# Patient Record
Sex: FEMALE | Race: ASIAN | NOT HISPANIC OR LATINO
[De-identification: names, ages, dates, MRNs, and addresses within clinical notes are randomized per-mention and may not be internally consistent; named-entity substitution may affect disease eponyms.]

---

## 2017-02-08 ENCOUNTER — APPOINTMENT (OUTPATIENT)
Dept: HEART AND VASCULAR | Facility: CLINIC | Age: 62
End: 2017-02-08

## 2017-02-08 VITALS
BODY MASS INDEX: 22.84 KG/M2 | SYSTOLIC BLOOD PRESSURE: 112 MMHG | DIASTOLIC BLOOD PRESSURE: 62 MMHG | WEIGHT: 121 LBS | HEIGHT: 61 IN | HEART RATE: 72 BPM

## 2017-02-13 ENCOUNTER — APPOINTMENT (OUTPATIENT)
Dept: SURGERY | Facility: CLINIC | Age: 62
End: 2017-02-13

## 2017-02-13 VITALS
OXYGEN SATURATION: 98 % | HEIGHT: 59 IN | WEIGHT: 121 LBS | BODY MASS INDEX: 24.39 KG/M2 | TEMPERATURE: 98.2 F | SYSTOLIC BLOOD PRESSURE: 138 MMHG | HEART RATE: 68 BPM | DIASTOLIC BLOOD PRESSURE: 78 MMHG

## 2017-02-15 LAB
ALBUMIN SERPL ELPH-MCNC: 3.9 G/DL
ALP BLD-CCNC: 74 U/L
ALT SERPL-CCNC: 22 U/L
ANION GAP SERPL CALC-SCNC: 14 MMOL/L
AST SERPL-CCNC: 17 U/L
BASOPHILS # BLD AUTO: 0.03 K/UL
BASOPHILS NFR BLD AUTO: 0.3 %
BILIRUB SERPL-MCNC: 0.8 MG/DL
BUN SERPL-MCNC: 12 MG/DL
CALCIUM SERPL-MCNC: 9.8 MG/DL
CHLORIDE SERPL-SCNC: 102 MMOL/L
CHOLEST SERPL-MCNC: 113 MG/DL
CHOLEST/HDLC SERPL: 2.8 RATIO
CO2 SERPL-SCNC: 26 MMOL/L
CREAT SERPL-MCNC: 1.01 MG/DL
EOSINOPHIL # BLD AUTO: 0.45 K/UL
EOSINOPHIL NFR BLD AUTO: 4.4 %
GLUCOSE SERPL-MCNC: 119 MG/DL
HBA1C MFR BLD HPLC: 8.2 %
HCT VFR BLD CALC: 37.1 %
HDLC SERPL-MCNC: 40 MG/DL
HGB BLD-MCNC: 11.1 G/DL
IMM GRANULOCYTES NFR BLD AUTO: 0.1 %
LDLC SERPL CALC-MCNC: 42 MG/DL
LYMPHOCYTES # BLD AUTO: 4.03 K/UL
LYMPHOCYTES NFR BLD AUTO: 39.7 %
MAN DIFF?: NORMAL
MCHC RBC-ENTMCNC: 25.7 PG
MCHC RBC-ENTMCNC: 29.9 GM/DL
MCV RBC AUTO: 85.9 FL
MONOCYTES # BLD AUTO: 0.63 K/UL
MONOCYTES NFR BLD AUTO: 6.2 %
NEUTROPHILS # BLD AUTO: 4.99 K/UL
NEUTROPHILS NFR BLD AUTO: 49.3 %
PLATELET # BLD AUTO: 302 K/UL
POTASSIUM SERPL-SCNC: 4.2 MMOL/L
PROT SERPL-MCNC: 6.7 G/DL
RBC # BLD: 4.32 M/UL
RBC # FLD: 16.2 %
SODIUM SERPL-SCNC: 142 MMOL/L
TRIGL SERPL-MCNC: 153 MG/DL
WBC # FLD AUTO: 10.14 K/UL

## 2017-03-01 NOTE — ASU PATIENT PROFILE, ADULT - PMH
Essential hypertension  HTN (hypertension)  Gastroesophageal reflux disease  GERD (gastroesophageal reflux disease)  Hyperlipidemia  HLD (hyperlipidemia)  Type 2 diabetes mellitus  DM (diabetes mellitus)

## 2017-03-02 ENCOUNTER — OUTPATIENT (OUTPATIENT)
Dept: OUTPATIENT SERVICES | Facility: HOSPITAL | Age: 62
LOS: 1 days | Discharge: HOME | End: 2017-03-02

## 2017-03-02 VITALS
HEIGHT: 59 IN | DIASTOLIC BLOOD PRESSURE: 70 MMHG | TEMPERATURE: 97 F | SYSTOLIC BLOOD PRESSURE: 156 MMHG | HEART RATE: 72 BPM | OXYGEN SATURATION: 95 % | RESPIRATION RATE: 16 BRPM | WEIGHT: 121.03 LBS

## 2017-03-02 DIAGNOSIS — Z98.890 OTHER SPECIFIED POSTPROCEDURAL STATES: Chronic | ICD-10-CM

## 2017-03-08 ENCOUNTER — APPOINTMENT (OUTPATIENT)
Dept: HEART AND VASCULAR | Facility: CLINIC | Age: 62
End: 2017-03-08

## 2017-03-08 VITALS — SYSTOLIC BLOOD PRESSURE: 114 MMHG | HEIGHT: 59 IN | HEART RATE: 72 BPM | DIASTOLIC BLOOD PRESSURE: 58 MMHG

## 2017-03-20 ENCOUNTER — APPOINTMENT (OUTPATIENT)
Dept: HEART AND VASCULAR | Facility: CLINIC | Age: 62
End: 2017-03-20

## 2017-03-20 VITALS
WEIGHT: 121 LBS | SYSTOLIC BLOOD PRESSURE: 124 MMHG | BODY MASS INDEX: 24.39 KG/M2 | HEART RATE: 66 BPM | HEIGHT: 59 IN | DIASTOLIC BLOOD PRESSURE: 62 MMHG

## 2017-03-22 ENCOUNTER — APPOINTMENT (OUTPATIENT)
Dept: HEART AND VASCULAR | Facility: CLINIC | Age: 62
End: 2017-03-22

## 2017-03-22 VITALS
SYSTOLIC BLOOD PRESSURE: 110 MMHG | DIASTOLIC BLOOD PRESSURE: 62 MMHG | BODY MASS INDEX: 25.4 KG/M2 | HEIGHT: 59 IN | WEIGHT: 126 LBS | HEART RATE: 78 BPM

## 2017-03-30 NOTE — ASU PATIENT PROFILE, ADULT - PMH
CAD (coronary artery disease)    Cholelithiasis    Diabetes mellitus    Disorder of lipid metabolism    GERD (gastroesophageal reflux disease)    Hypertension    NSTEMI (non-ST elevated myocardial infarction)    Stented coronary artery

## 2017-03-31 ENCOUNTER — OUTPATIENT (OUTPATIENT)
Dept: OUTPATIENT SERVICES | Facility: HOSPITAL | Age: 62
LOS: 1 days | Discharge: HOME | End: 2017-03-31

## 2017-03-31 VITALS
SYSTOLIC BLOOD PRESSURE: 167 MMHG | OXYGEN SATURATION: 99 % | WEIGHT: 122.8 LBS | DIASTOLIC BLOOD PRESSURE: 76 MMHG | HEART RATE: 72 BPM | TEMPERATURE: 98 F | RESPIRATION RATE: 16 BRPM | HEIGHT: 61 IN

## 2017-03-31 DIAGNOSIS — Z95.5 PRESENCE OF CORONARY ANGIOPLASTY IMPLANT AND GRAFT: Chronic | ICD-10-CM

## 2017-03-31 DIAGNOSIS — Z98.890 OTHER SPECIFIED POSTPROCEDURAL STATES: Chronic | ICD-10-CM

## 2017-04-05 ENCOUNTER — RESULT REVIEW (OUTPATIENT)
Age: 62
End: 2017-04-05

## 2017-04-05 NOTE — ASU PATIENT PROFILE, ADULT - PMH
CAD (coronary artery disease)    Essential hypertension  HTN (hypertension)  Gastroesophageal reflux disease  GERD (gastroesophageal reflux disease)  Hyperlipidemia  HLD (hyperlipidemia)  MI (myocardial infarction)  2015  Type 2 diabetes mellitus  DM (diabetes mellitus)

## 2017-04-06 ENCOUNTER — OUTPATIENT (OUTPATIENT)
Dept: OUTPATIENT SERVICES | Facility: HOSPITAL | Age: 62
LOS: 1 days | Discharge: HOME | End: 2017-04-06
Payer: COMMERCIAL

## 2017-04-06 VITALS
SYSTOLIC BLOOD PRESSURE: 144 MMHG | RESPIRATION RATE: 16 BRPM | WEIGHT: 122.14 LBS | TEMPERATURE: 97 F | OXYGEN SATURATION: 98 % | HEART RATE: 68 BPM | HEIGHT: 61 IN | DIASTOLIC BLOOD PRESSURE: 65 MMHG

## 2017-04-06 VITALS
OXYGEN SATURATION: 95 % | SYSTOLIC BLOOD PRESSURE: 146 MMHG | TEMPERATURE: 98 F | DIASTOLIC BLOOD PRESSURE: 81 MMHG | HEART RATE: 96 BPM | RESPIRATION RATE: 17 BRPM

## 2017-04-06 DIAGNOSIS — Z98.890 OTHER SPECIFIED POSTPROCEDURAL STATES: Chronic | ICD-10-CM

## 2017-04-06 DIAGNOSIS — Z95.5 PRESENCE OF CORONARY ANGIOPLASTY IMPLANT AND GRAFT: Chronic | ICD-10-CM

## 2017-04-06 PROCEDURE — 86900 BLOOD TYPING SEROLOGIC ABO: CPT

## 2017-04-06 PROCEDURE — 47562 LAPAROSCOPIC CHOLECYSTECTOMY: CPT | Mod: GC

## 2017-04-06 PROCEDURE — C1889: CPT

## 2017-04-06 PROCEDURE — 86901 BLOOD TYPING SEROLOGIC RH(D): CPT

## 2017-04-06 PROCEDURE — 88304 TISSUE EXAM BY PATHOLOGIST: CPT

## 2017-04-06 PROCEDURE — 86850 RBC ANTIBODY SCREEN: CPT

## 2017-04-06 PROCEDURE — 47562 LAPAROSCOPIC CHOLECYSTECTOMY: CPT

## 2017-04-06 RX ORDER — SODIUM CHLORIDE 9 MG/ML
500 INJECTION, SOLUTION INTRAVENOUS
Qty: 0 | Refills: 0 | Status: DISCONTINUED | OUTPATIENT
Start: 2017-04-06 | End: 2017-04-06

## 2017-04-06 NOTE — BRIEF OPERATIVE NOTE - POST-OP DX
Cholelithiasis  04/06/2017    Active  Celina Sharma Calculus of gallbladder with chronic cholecystitis without obstruction  04/06/2017    Active  Nava Prince

## 2017-04-06 NOTE — PACU DISCHARGE NOTE - COMMENTS
pt verbalized understanding all discharged instruction  pt stable ambulating to the bathroom  iv disocntinuo  pt left accompanied by her family menmendoza

## 2017-04-07 PROBLEM — I21.3 ST ELEVATION (STEMI) MYOCARDIAL INFARCTION OF UNSPECIFIED SITE: Chronic | Status: ACTIVE | Noted: 2017-04-05

## 2017-04-07 PROBLEM — I25.10 ATHEROSCLEROTIC HEART DISEASE OF NATIVE CORONARY ARTERY WITHOUT ANGINA PECTORIS: Chronic | Status: ACTIVE | Noted: 2017-04-05

## 2017-04-13 ENCOUNTER — APPOINTMENT (OUTPATIENT)
Dept: SURGERY | Facility: CLINIC | Age: 62
End: 2017-04-13

## 2017-04-13 ENCOUNTER — EMERGENCY (EMERGENCY)
Facility: HOSPITAL | Age: 62
LOS: 1 days | Discharge: HOME | End: 2017-04-13
Attending: EMERGENCY MEDICINE | Admitting: EMERGENCY MEDICINE
Payer: COMMERCIAL

## 2017-04-13 VITALS
SYSTOLIC BLOOD PRESSURE: 188 MMHG | HEIGHT: 59 IN | DIASTOLIC BLOOD PRESSURE: 83 MMHG | WEIGHT: 126 LBS | HEART RATE: 76 BPM | BODY MASS INDEX: 25.4 KG/M2 | TEMPERATURE: 97.3 F

## 2017-04-13 VITALS
SYSTOLIC BLOOD PRESSURE: 166 MMHG | HEART RATE: 62 BPM | DIASTOLIC BLOOD PRESSURE: 90 MMHG | TEMPERATURE: 98 F | OXYGEN SATURATION: 98 % | RESPIRATION RATE: 18 BRPM

## 2017-04-13 VITALS
RESPIRATION RATE: 18 BRPM | OXYGEN SATURATION: 98 % | DIASTOLIC BLOOD PRESSURE: 75 MMHG | HEART RATE: 58 BPM | SYSTOLIC BLOOD PRESSURE: 149 MMHG | TEMPERATURE: 97 F

## 2017-04-13 DIAGNOSIS — Z79.82 LONG TERM (CURRENT) USE OF ASPIRIN: ICD-10-CM

## 2017-04-13 DIAGNOSIS — Z79.899 OTHER LONG TERM (CURRENT) DRUG THERAPY: ICD-10-CM

## 2017-04-13 DIAGNOSIS — Z98.890 OTHER SPECIFIED POSTPROCEDURAL STATES: Chronic | ICD-10-CM

## 2017-04-13 DIAGNOSIS — R53.1 WEAKNESS: ICD-10-CM

## 2017-04-13 DIAGNOSIS — E78.5 HYPERLIPIDEMIA, UNSPECIFIED: ICD-10-CM

## 2017-04-13 DIAGNOSIS — I25.10 ATHEROSCLEROTIC HEART DISEASE OF NATIVE CORONARY ARTERY WITHOUT ANGINA PECTORIS: ICD-10-CM

## 2017-04-13 DIAGNOSIS — E11.9 TYPE 2 DIABETES MELLITUS WITHOUT COMPLICATIONS: ICD-10-CM

## 2017-04-13 DIAGNOSIS — R11.2 NAUSEA WITH VOMITING, UNSPECIFIED: ICD-10-CM

## 2017-04-13 DIAGNOSIS — Z95.5 PRESENCE OF CORONARY ANGIOPLASTY IMPLANT AND GRAFT: Chronic | ICD-10-CM

## 2017-04-13 DIAGNOSIS — I10 ESSENTIAL (PRIMARY) HYPERTENSION: ICD-10-CM

## 2017-04-13 LAB
ALBUMIN SERPL ELPH-MCNC: 3.6 G/DL — SIGNIFICANT CHANGE UP (ref 3.4–5)
ALP SERPL-CCNC: 91 U/L — SIGNIFICANT CHANGE UP (ref 40–120)
ALT FLD-CCNC: 21 U/L — SIGNIFICANT CHANGE UP (ref 12–42)
ANION GAP SERPL CALC-SCNC: 8 MMOL/L — LOW (ref 9–16)
APPEARANCE UR: CLEAR — SIGNIFICANT CHANGE UP
AST SERPL-CCNC: 9 U/L — LOW (ref 15–37)
BACTERIA # UR AUTO: PRESENT /HPF
BILIRUB SERPL-MCNC: 0.7 MG/DL — SIGNIFICANT CHANGE UP (ref 0.2–1.2)
BILIRUB UR-MCNC: NEGATIVE — SIGNIFICANT CHANGE UP
BUN SERPL-MCNC: 14 MG/DL — SIGNIFICANT CHANGE UP (ref 7–23)
CALCIUM SERPL-MCNC: 9.8 MG/DL — SIGNIFICANT CHANGE UP (ref 8.5–10.5)
CHLORIDE SERPL-SCNC: 101 MMOL/L — SIGNIFICANT CHANGE UP (ref 96–108)
CO2 SERPL-SCNC: 30 MMOL/L — SIGNIFICANT CHANGE UP (ref 22–31)
COLOR SPEC: YELLOW — SIGNIFICANT CHANGE UP
CREAT SERPL-MCNC: 0.76 MG/DL — SIGNIFICANT CHANGE UP (ref 0.5–1.3)
DIFF PNL FLD: (no result)
EPI CELLS # UR: SIGNIFICANT CHANGE UP /HPF
EXTRA BLUE TOP TUBE: SIGNIFICANT CHANGE UP
EXTRA SST TUBE: SIGNIFICANT CHANGE UP
GLUCOSE SERPL-MCNC: 66 MG/DL — LOW (ref 70–99)
GLUCOSE UR QL: 100
HCT VFR BLD CALC: 35.2 % — SIGNIFICANT CHANGE UP (ref 34.5–45)
HGB BLD-MCNC: 11.6 G/DL — SIGNIFICANT CHANGE UP (ref 11.5–15.5)
KETONES UR-MCNC: NEGATIVE — SIGNIFICANT CHANGE UP
LEUKOCYTE ESTERASE UR-ACNC: NEGATIVE — SIGNIFICANT CHANGE UP
MCHC RBC-ENTMCNC: 26.3 PG — LOW (ref 27–34)
MCHC RBC-ENTMCNC: 33 G/DL — SIGNIFICANT CHANGE UP (ref 32–36)
MCV RBC AUTO: 79.8 FL — LOW (ref 80–100)
NITRITE UR-MCNC: NEGATIVE — SIGNIFICANT CHANGE UP
PH UR: 6.5 — SIGNIFICANT CHANGE UP (ref 4–8)
PLATELET # BLD AUTO: 276 K/UL — SIGNIFICANT CHANGE UP (ref 150–400)
POTASSIUM SERPL-MCNC: 3.2 MMOL/L — LOW (ref 3.5–5.3)
POTASSIUM SERPL-SCNC: 3.2 MMOL/L — LOW (ref 3.5–5.3)
PROT SERPL-MCNC: 8.1 G/DL — SIGNIFICANT CHANGE UP (ref 6.4–8.2)
PROT UR-MCNC: NEGATIVE MG/DL — SIGNIFICANT CHANGE UP
RBC # BLD: 4.41 M/UL — SIGNIFICANT CHANGE UP (ref 3.8–5.2)
RBC # FLD: 15 % — SIGNIFICANT CHANGE UP (ref 10.3–16.9)
RBC CASTS # UR COMP ASSIST: < 5 /HPF — SIGNIFICANT CHANGE UP
SODIUM SERPL-SCNC: 139 MMOL/L — SIGNIFICANT CHANGE UP (ref 135–145)
SP GR SPEC: 1.01 — SIGNIFICANT CHANGE UP (ref 1–1.03)
SURGICAL PATHOLOGY STUDY: SIGNIFICANT CHANGE UP
UROBILINOGEN FLD QL: 0.2 E.U./DL — SIGNIFICANT CHANGE UP
WBC # BLD: 12.9 K/UL — HIGH (ref 3.8–10.5)
WBC # FLD AUTO: 12.9 K/UL — HIGH (ref 3.8–10.5)
WBC UR QL: < 5 /HPF — SIGNIFICANT CHANGE UP

## 2017-04-13 PROCEDURE — 93010 ELECTROCARDIOGRAM REPORT: CPT

## 2017-04-13 PROCEDURE — 82553 CREATINE MB FRACTION: CPT

## 2017-04-13 PROCEDURE — 81001 URINALYSIS AUTO W/SCOPE: CPT

## 2017-04-13 PROCEDURE — 93005 ELECTROCARDIOGRAM TRACING: CPT

## 2017-04-13 PROCEDURE — 84484 ASSAY OF TROPONIN QUANT: CPT

## 2017-04-13 PROCEDURE — 76705 ECHO EXAM OF ABDOMEN: CPT | Mod: 26

## 2017-04-13 PROCEDURE — 36415 COLL VENOUS BLD VENIPUNCTURE: CPT

## 2017-04-13 PROCEDURE — 85027 COMPLETE CBC AUTOMATED: CPT

## 2017-04-13 PROCEDURE — 99284 EMERGENCY DEPT VISIT MOD MDM: CPT | Mod: 25

## 2017-04-13 PROCEDURE — 99285 EMERGENCY DEPT VISIT HI MDM: CPT | Mod: 25

## 2017-04-13 PROCEDURE — 82550 ASSAY OF CK (CPK): CPT

## 2017-04-13 PROCEDURE — 83690 ASSAY OF LIPASE: CPT

## 2017-04-13 PROCEDURE — 80053 COMPREHEN METABOLIC PANEL: CPT

## 2017-04-13 PROCEDURE — 76705 ECHO EXAM OF ABDOMEN: CPT

## 2017-04-13 RX ORDER — SIMVASTATIN 20 MG/1
0 TABLET, FILM COATED ORAL
Qty: 0 | Refills: 0 | COMMUNITY

## 2017-04-13 RX ORDER — HUMAN INSULIN 100 [IU]/ML
0 INJECTION, SUSPENSION SUBCUTANEOUS
Qty: 0 | Refills: 0 | COMMUNITY

## 2017-04-13 RX ORDER — ASPIRIN/CALCIUM CARB/MAGNESIUM 324 MG
1 TABLET ORAL
Qty: 0 | Refills: 0 | COMMUNITY

## 2017-04-13 RX ORDER — INSULIN LISPRO 100/ML
0 VIAL (ML) SUBCUTANEOUS
Qty: 0 | Refills: 0 | COMMUNITY

## 2017-04-13 RX ORDER — METFORMIN HYDROCHLORIDE 850 MG/1
0 TABLET ORAL
Qty: 0 | Refills: 0 | COMMUNITY

## 2017-04-13 RX ORDER — SODIUM CHLORIDE 9 MG/ML
1000 INJECTION INTRAMUSCULAR; INTRAVENOUS; SUBCUTANEOUS ONCE
Qty: 0 | Refills: 0 | Status: COMPLETED | OUTPATIENT
Start: 2017-04-13 | End: 2017-04-13

## 2017-04-13 RX ORDER — SODIUM CHLORIDE 9 MG/ML
1000 INJECTION INTRAMUSCULAR; INTRAVENOUS; SUBCUTANEOUS
Qty: 0 | Refills: 0 | Status: DISCONTINUED | OUTPATIENT
Start: 2017-04-13 | End: 2017-04-17

## 2017-04-13 RX ORDER — POTASSIUM CHLORIDE 20 MEQ
40 PACKET (EA) ORAL ONCE
Qty: 0 | Refills: 0 | Status: COMPLETED | OUTPATIENT
Start: 2017-04-13 | End: 2017-04-13

## 2017-04-13 RX ADMIN — Medication 40 MILLIEQUIVALENT(S): at 19:13

## 2017-04-13 RX ADMIN — SODIUM CHLORIDE 2000 MILLILITER(S): 9 INJECTION INTRAMUSCULAR; INTRAVENOUS; SUBCUTANEOUS at 18:17

## 2017-04-13 RX ADMIN — SODIUM CHLORIDE 125 MILLILITER(S): 9 INJECTION INTRAMUSCULAR; INTRAVENOUS; SUBCUTANEOUS at 18:08

## 2017-04-13 NOTE — CONSULT NOTE ADULT - ASSESSMENT
60 yo female 1 week s/p cholecystectomy with vomiting and hypoglycemia and elevated WBC    Plan:  Obtain RUQ US looking for biloma  If negative for biloma and feeling better after sugar controlled can be DC and follow up with Dr. Prince in 2 weeks  Will follow up results of US  Discussed with Dr. Prince

## 2017-04-13 NOTE — ED ADULT NURSE REASSESSMENT NOTE - NS ED NURSE REASSESS COMMENT FT1
MD Director notified of FS, juice and abdirahman crackers given to pt. Will continue to assess. MD at bedside.

## 2017-04-13 NOTE — ED ADULT NURSE NOTE - CHPI ED SYMPTOMS NEG
no dysuria/no diarrhea/no chills/no fever/no burning urination/no blood in stool/no hematuria/no abdominal distension

## 2017-04-13 NOTE — ED PROVIDER NOTE - PROGRESS NOTE DETAILS
Pt seen briefly by surgery team - no surg issue.  Pt feeling well, asking for food and tolerating po's in ed.  Abd remains nontender.  WBC elevated w nl chem/lft's, abd exam.  Ua ordered.  FS wnl in ed, given food. Surg now requesting ruq u/s to eval for biloma.  US ordered. Pt w/o bileoma on u/s.  Cleared for dc by surgery

## 2017-04-13 NOTE — ED ADULT TRIAGE NOTE - CHIEF COMPLAINT QUOTE
BIBA c/o n/v x 2 episodes while in the car. EMS states pt reports of "gets car sickness." post gallbladder surgery ~ 1 week ago. Denies fever.

## 2017-04-13 NOTE — CONSULT NOTE ADULT - SUBJECTIVE AND OBJECTIVE BOX
HPI: 62 yo female with PMHx of CAD with stents, DM2, GERD, HLD, who recently had cholecystectomy by Dr. Prince 1 week from today presents from Dr. rPince's office for nausea, vomiting and abdominal pain. The patient reports that she often has these symptoms and that they seem related to her blood sugar. She had a low blood sugar at Dr. Prince's office. In the ED she received glucose and feels better, no abdominal pain. WBC mildly elevated to 12.8. Incisions CDI      PAST MEDICAL & SURGICAL HISTORY:  CAD (coronary artery disease)  MI (myocardial infarction): 2015  Essential hypertension: HTN (hypertension)  Type 2 diabetes mellitus: DM (diabetes mellitus)  Gastroesophageal reflux disease: GERD (gastroesophageal reflux disease)  Hyperlipidemia: HLD (hyperlipidemia)  History of surgery: galbladder  History of surgery: cardiac stent x3          MEDICATIONS  (STANDING):  sodium chloride 0.9%. 1000milliLiter(s) IV Continuous <Continuous>    MEDICATIONS  (PRN):      Allergies    No Known Drug Allergies  Originally Entered as [Rash-General] reaction to [Other][eggplant] (Rash)    Intolerances        SOCIAL HISTORY:        Vital Signs Last 24 Hrs  T(C): 36.3, Max: 36.3 (04-13 @ 17:19)  T(F): 97.3, Max: 97.3 (04-13 @ 17:19)  HR: 68 (58 - 68)  BP: 170/81 (149/75 - 170/81)  BP(mean): --  RR: 18 (18 - 18)  SpO2: 98% (98% - 98%)    Physical Exam:  Gen: In bed, A/Ox3, NAD  Card: RRR, No m/r/g  Lungs: CTAB/L, no wheezes or crackles  Abdomen: Soft, nontender, nondistended, incisions CDI  Extremities: No edema    LABS:                        11.6   12.9  )-----------( 276      ( 13 Apr 2017 18:12 )             35.2     04-13    139  |  101  |  14  ----------------------------<  66<L>  3.2<L>   |  30  |  0.76    Ca    9.8      13 Apr 2017 18:12    TPro  8.1  /  Alb  3.6  /  TBili  0.7  /  DBili  x   /  AST  9<L>  /  ALT  21  /  AlkPhos  91  04-13          RADIOLOGY & ADDITIONAL STUDIES:

## 2017-04-13 NOTE — ED PROVIDER NOTE - OBJECTIVE STATEMENT
62 yo female h/o cad s/p stent x 2, htn, dm, hld, s/p cholecystectomy 2 wk ago sent by Dr Olvera for c/o n/v x 2 while traveling to post op check appt.  Pt reports h/o motion sickness in the car w n/v in the past.  No n/v now, no abd pain, no fever, no cp/palpitations/sob.  Pt c/o gen weakness.  Pt took dm meds today, ate less than nl.  FS 50's at md's office, 79 here. Pt c/o being hungry.  No other complaint.  History via  interpreting per pt's preference.

## 2017-04-13 NOTE — ED PROVIDER NOTE - MEDICAL DECISION MAKING DETAILS
Pt c/o resolved n/v while traveling in car w h/o motion sickness.  Pt also c/o weakness, fs low at md's office, wnl here.  No abd pain and no ttp.  Afebrile.  Suspect motion sickness.  H/o cad but denies cp/cardiac sx, ekg w/o stemi.  No sig concern for acs.  Will check labs, give ivf, reassess.

## 2017-04-13 NOTE — ED ADULT NURSE NOTE - OBJECTIVE STATEMENT
60 yo F, c/o vomiting.  Pt sleepy, bangledesh speaking looking to  at bedside to translate.  Pt  states "she had gallbladder surgery in this hospital a week ago, we went to the follow up appt and she began vomiting. We checked her blood sugar and it was 69, I gave her chocolate".  Pt denies fever, cough or diarrhea, CP or SOB.

## 2017-04-14 DIAGNOSIS — K21.9 GASTRO-ESOPHAGEAL REFLUX DISEASE WITHOUT ESOPHAGITIS: ICD-10-CM

## 2017-04-14 DIAGNOSIS — Z79.02 LONG TERM (CURRENT) USE OF ANTITHROMBOTICS/ANTIPLATELETS: ICD-10-CM

## 2017-04-14 DIAGNOSIS — I25.10 ATHEROSCLEROTIC HEART DISEASE OF NATIVE CORONARY ARTERY WITHOUT ANGINA PECTORIS: ICD-10-CM

## 2017-04-14 DIAGNOSIS — Z79.82 LONG TERM (CURRENT) USE OF ASPIRIN: ICD-10-CM

## 2017-04-14 DIAGNOSIS — K80.12 CALCULUS OF GALLBLADDER WITH ACUTE AND CHRONIC CHOLECYSTITIS WITHOUT OBSTRUCTION: ICD-10-CM

## 2017-04-14 DIAGNOSIS — I25.2 OLD MYOCARDIAL INFARCTION: ICD-10-CM

## 2017-04-14 DIAGNOSIS — Z95.5 PRESENCE OF CORONARY ANGIOPLASTY IMPLANT AND GRAFT: ICD-10-CM

## 2017-04-14 DIAGNOSIS — I10 ESSENTIAL (PRIMARY) HYPERTENSION: ICD-10-CM

## 2017-04-14 DIAGNOSIS — Z79.4 LONG TERM (CURRENT) USE OF INSULIN: ICD-10-CM

## 2017-04-14 DIAGNOSIS — E11.9 TYPE 2 DIABETES MELLITUS WITHOUT COMPLICATIONS: ICD-10-CM

## 2017-04-24 ENCOUNTER — APPOINTMENT (OUTPATIENT)
Dept: SURGERY | Facility: CLINIC | Age: 62
End: 2017-04-24

## 2017-04-24 VITALS
OXYGEN SATURATION: 98 % | TEMPERATURE: 97.6 F | BODY MASS INDEX: 24.6 KG/M2 | HEART RATE: 71 BPM | DIASTOLIC BLOOD PRESSURE: 48 MMHG | WEIGHT: 122 LBS | HEIGHT: 59 IN | SYSTOLIC BLOOD PRESSURE: 104 MMHG

## 2017-04-24 DIAGNOSIS — Z86.19 PERSONAL HISTORY OF OTHER INFECTIOUS AND PARASITIC DISEASES: ICD-10-CM

## 2017-04-24 DIAGNOSIS — Z83.79 FAMILY HISTORY OF OTHER DISEASES OF THE DIGESTIVE SYSTEM: ICD-10-CM

## 2017-04-24 DIAGNOSIS — I25.2 OLD MYOCARDIAL INFARCTION: ICD-10-CM

## 2017-05-10 ENCOUNTER — APPOINTMENT (OUTPATIENT)
Dept: HEART AND VASCULAR | Facility: CLINIC | Age: 62
End: 2017-05-10

## 2017-05-10 VITALS
HEIGHT: 59 IN | SYSTOLIC BLOOD PRESSURE: 128 MMHG | WEIGHT: 124 LBS | BODY MASS INDEX: 25 KG/M2 | DIASTOLIC BLOOD PRESSURE: 70 MMHG | HEART RATE: 66 BPM

## 2017-07-12 ENCOUNTER — APPOINTMENT (OUTPATIENT)
Dept: HEART AND VASCULAR | Facility: CLINIC | Age: 62
End: 2017-07-12

## 2017-07-12 VITALS
BODY MASS INDEX: 25.2 KG/M2 | HEART RATE: 73 BPM | DIASTOLIC BLOOD PRESSURE: 60 MMHG | HEIGHT: 59 IN | WEIGHT: 125 LBS | SYSTOLIC BLOOD PRESSURE: 110 MMHG

## 2017-09-13 ENCOUNTER — APPOINTMENT (OUTPATIENT)
Dept: HEART AND VASCULAR | Facility: CLINIC | Age: 62
End: 2017-09-13
Payer: MEDICAID

## 2017-09-13 VITALS
HEART RATE: 71 BPM | BODY MASS INDEX: 25.4 KG/M2 | HEIGHT: 59 IN | SYSTOLIC BLOOD PRESSURE: 104 MMHG | WEIGHT: 126 LBS | DIASTOLIC BLOOD PRESSURE: 58 MMHG

## 2017-09-13 DIAGNOSIS — K80.20 CALCULUS OF GALLBLADDER W/OUT CHOLECYSTITIS W/OUT OBSTRUCTION: ICD-10-CM

## 2017-09-13 PROCEDURE — 99214 OFFICE O/P EST MOD 30 MIN: CPT

## 2018-03-20 PROBLEM — E11.9 TYPE 2 DIABETES MELLITUS WITHOUT COMPLICATIONS: Chronic | Status: ACTIVE | Noted: 2017-03-30

## 2018-03-20 PROBLEM — I21.4 NON-ST ELEVATION (NSTEMI) MYOCARDIAL INFARCTION: Chronic | Status: ACTIVE | Noted: 2017-03-30

## 2018-03-20 PROBLEM — K80.20 CALCULUS OF GALLBLADDER WITHOUT CHOLECYSTITIS WITHOUT OBSTRUCTION: Chronic | Status: ACTIVE | Noted: 2017-03-30

## 2018-03-20 PROBLEM — K21.9 GASTRO-ESOPHAGEAL REFLUX DISEASE WITHOUT ESOPHAGITIS: Chronic | Status: ACTIVE | Noted: 2017-03-30

## 2018-03-20 PROBLEM — E78.9 DISORDER OF LIPOPROTEIN METABOLISM, UNSPECIFIED: Chronic | Status: ACTIVE | Noted: 2017-03-30

## 2018-03-20 PROBLEM — I10 ESSENTIAL (PRIMARY) HYPERTENSION: Chronic | Status: ACTIVE | Noted: 2017-03-30

## 2018-03-20 PROBLEM — Z95.5 PRESENCE OF CORONARY ANGIOPLASTY IMPLANT AND GRAFT: Chronic | Status: ACTIVE | Noted: 2017-03-30

## 2018-03-20 PROBLEM — I25.10 ATHEROSCLEROTIC HEART DISEASE OF NATIVE CORONARY ARTERY WITHOUT ANGINA PECTORIS: Chronic | Status: ACTIVE | Noted: 2017-03-30

## 2018-04-04 ENCOUNTER — APPOINTMENT (OUTPATIENT)
Dept: HEART AND VASCULAR | Facility: CLINIC | Age: 63
End: 2018-04-04
Payer: MEDICAID

## 2018-04-04 VITALS
BODY MASS INDEX: 25.4 KG/M2 | WEIGHT: 126 LBS | DIASTOLIC BLOOD PRESSURE: 62 MMHG | HEIGHT: 59 IN | SYSTOLIC BLOOD PRESSURE: 120 MMHG | HEART RATE: 74 BPM

## 2018-04-04 PROCEDURE — 93000 ELECTROCARDIOGRAM COMPLETE: CPT

## 2018-04-04 PROCEDURE — 99214 OFFICE O/P EST MOD 30 MIN: CPT | Mod: 25

## 2018-04-05 LAB
25(OH)D3 SERPL-MCNC: 25 NG/ML
ALBUMIN SERPL ELPH-MCNC: 4.1 G/DL
ALP BLD-CCNC: 78 U/L
ALT SERPL-CCNC: 25 U/L
ANION GAP SERPL CALC-SCNC: 13 MMOL/L
AST SERPL-CCNC: 26 U/L
BASOPHILS # BLD AUTO: 0.04 K/UL
BASOPHILS NFR BLD AUTO: 0.5 %
BILIRUB SERPL-MCNC: 0.6 MG/DL
BUN SERPL-MCNC: 19 MG/DL
CALCIUM SERPL-MCNC: 9.6 MG/DL
CHLORIDE SERPL-SCNC: 102 MMOL/L
CHOLEST SERPL-MCNC: 109 MG/DL
CHOLEST/HDLC SERPL: 2.5 RATIO
CO2 SERPL-SCNC: 24 MMOL/L
CREAT SERPL-MCNC: 1.39 MG/DL
EOSINOPHIL # BLD AUTO: 0.44 K/UL
EOSINOPHIL NFR BLD AUTO: 5.1 %
GLUCOSE SERPL-MCNC: 77 MG/DL
HBA1C MFR BLD HPLC: 8.1 %
HCT VFR BLD CALC: 34 %
HDLC SERPL-MCNC: 43 MG/DL
HGB BLD-MCNC: 10.3 G/DL
IMM GRANULOCYTES NFR BLD AUTO: 0.2 %
LDLC SERPL CALC-MCNC: 43 MG/DL
LYMPHOCYTES # BLD AUTO: 3.67 K/UL
LYMPHOCYTES NFR BLD AUTO: 42.4 %
MAN DIFF?: NORMAL
MCHC RBC-ENTMCNC: 25.9 PG
MCHC RBC-ENTMCNC: 30.3 GM/DL
MCV RBC AUTO: 85.4 FL
MONOCYTES # BLD AUTO: 0.6 K/UL
MONOCYTES NFR BLD AUTO: 6.9 %
NEUTROPHILS # BLD AUTO: 3.89 K/UL
NEUTROPHILS NFR BLD AUTO: 44.9 %
PLATELET # BLD AUTO: 273 K/UL
POTASSIUM SERPL-SCNC: 4.8 MMOL/L
PROT SERPL-MCNC: 6.8 G/DL
RBC # BLD: 3.98 M/UL
RBC # FLD: 16.2 %
SODIUM SERPL-SCNC: 139 MMOL/L
TRIGL SERPL-MCNC: 114 MG/DL
TSH SERPL-ACNC: 2.55 UIU/ML
WBC # FLD AUTO: 8.66 K/UL

## 2018-04-16 ENCOUNTER — APPOINTMENT (OUTPATIENT)
Dept: NEPHROLOGY | Facility: CLINIC | Age: 63
End: 2018-04-16
Payer: MEDICAID

## 2018-04-16 ENCOUNTER — LABORATORY RESULT (OUTPATIENT)
Age: 63
End: 2018-04-16

## 2018-04-16 VITALS — SYSTOLIC BLOOD PRESSURE: 126 MMHG | DIASTOLIC BLOOD PRESSURE: 68 MMHG

## 2018-04-16 VITALS — SYSTOLIC BLOOD PRESSURE: 117 MMHG | DIASTOLIC BLOOD PRESSURE: 52 MMHG

## 2018-04-16 VITALS — WEIGHT: 126 LBS | BODY MASS INDEX: 25.45 KG/M2

## 2018-04-16 DIAGNOSIS — Z83.3 FAMILY HISTORY OF DIABETES MELLITUS: ICD-10-CM

## 2018-04-16 PROCEDURE — 36415 COLL VENOUS BLD VENIPUNCTURE: CPT

## 2018-04-16 PROCEDURE — 99205 OFFICE O/P NEW HI 60 MIN: CPT | Mod: 25

## 2018-04-20 LAB
25(OH)D3 SERPL-MCNC: 27.8 NG/ML
ALBUMIN SERPL ELPH-MCNC: 4.4 G/DL
ALP BLD-CCNC: 83 U/L
ALT SERPL-CCNC: 26 U/L
ANION GAP SERPL CALC-SCNC: 12 MMOL/L
APPEARANCE: CLEAR
AST SERPL-CCNC: 27 U/L
BASOPHILS # BLD AUTO: 0.03 K/UL
BASOPHILS NFR BLD AUTO: 0.3 %
BILIRUB SERPL-MCNC: 0.6 MG/DL
BILIRUBIN URINE: NEGATIVE
BLOOD URINE: NEGATIVE
BUN SERPL-MCNC: 14 MG/DL
CALCIUM SERPL-MCNC: 10 MG/DL
CALCIUM SERPL-MCNC: 10 MG/DL
CHLORIDE SERPL-SCNC: 102 MMOL/L
CO2 SERPL-SCNC: 27 MMOL/L
COLOR: YELLOW
CREAT SERPL-MCNC: 1.17 MG/DL
CREAT SPEC-SCNC: 63 MG/DL
DEPRECATED KAPPA LC FREE/LAMBDA SER: 1.32 RATIO
EOSINOPHIL # BLD AUTO: 0.41 K/UL
EOSINOPHIL NFR BLD AUTO: 4.7 %
GLUCOSE QUALITATIVE U: NEGATIVE MG/DL
GLUCOSE SERPL-MCNC: 94 MG/DL
HCT VFR BLD CALC: 34.6 %
HGB BLD-MCNC: 10.2 G/DL
IGA 24H UR QL IFE: NORMAL
IMM GRANULOCYTES NFR BLD AUTO: 0.1 %
KAPPA LC CSF-MCNC: 2.62 MG/DL
KAPPA LC SERPL-MCNC: 3.45 MG/DL
KETONES URINE: NEGATIVE
LEUKOCYTE ESTERASE URINE: NEGATIVE
LYMPHOCYTES # BLD AUTO: 3.32 K/UL
LYMPHOCYTES NFR BLD AUTO: 37.8 %
M PROTEIN SPEC IFE-MCNC: NORMAL
MAGNESIUM SERPL-MCNC: 2.2 MG/DL
MAN DIFF?: NORMAL
MCHC RBC-ENTMCNC: 24.8 PG
MCHC RBC-ENTMCNC: 29.5 GM/DL
MCV RBC AUTO: 84 FL
MICROALBUMIN 24H UR DL<=1MG/L-MCNC: 0.4 MG/DL
MICROALBUMIN/CREAT 24H UR-RTO: 6 MG/G
MONOCYTES # BLD AUTO: 0.5 K/UL
MONOCYTES NFR BLD AUTO: 5.7 %
NEUTROPHILS # BLD AUTO: 4.51 K/UL
NEUTROPHILS NFR BLD AUTO: 51.4 %
NITRITE URINE: NEGATIVE
PARATHYROID HORMONE INTACT: 53 PG/ML
PH URINE: 5
PHOSPHATE SERPL-MCNC: 4.1 MG/DL
PLATELET # BLD AUTO: 305 K/UL
POTASSIUM SERPL-SCNC: 4.7 MMOL/L
PROT SERPL-MCNC: 7.6 G/DL
PROTEIN URINE: NEGATIVE MG/DL
RBC # BLD: 4.12 M/UL
RBC # FLD: 16.7 %
SODIUM SERPL-SCNC: 141 MMOL/L
SPECIFIC GRAVITY URINE: 1.01
URATE SERPL-MCNC: 5.6 MG/DL
UROBILINOGEN URINE: NEGATIVE MG/DL
VIT B12 SERPL-MCNC: 219 PG/ML
WBC # FLD AUTO: 8.78 K/UL

## 2018-04-22 ENCOUNTER — FORM ENCOUNTER (OUTPATIENT)
Age: 63
End: 2018-04-22

## 2018-04-23 ENCOUNTER — OUTPATIENT (OUTPATIENT)
Dept: OUTPATIENT SERVICES | Facility: HOSPITAL | Age: 63
LOS: 1 days | End: 2018-04-23
Payer: COMMERCIAL

## 2018-04-23 ENCOUNTER — APPOINTMENT (OUTPATIENT)
Dept: ULTRASOUND IMAGING | Facility: HOSPITAL | Age: 63
End: 2018-04-23
Payer: MEDICAID

## 2018-04-23 DIAGNOSIS — Z98.890 OTHER SPECIFIED POSTPROCEDURAL STATES: Chronic | ICD-10-CM

## 2018-04-23 DIAGNOSIS — Z95.5 PRESENCE OF CORONARY ANGIOPLASTY IMPLANT AND GRAFT: Chronic | ICD-10-CM

## 2018-04-23 PROCEDURE — 76770 US EXAM ABDO BACK WALL COMP: CPT | Mod: 26

## 2018-04-23 PROCEDURE — 76770 US EXAM ABDO BACK WALL COMP: CPT

## 2018-08-01 ENCOUNTER — APPOINTMENT (OUTPATIENT)
Dept: HEART AND VASCULAR | Facility: CLINIC | Age: 63
End: 2018-08-01
Payer: MEDICAID

## 2018-08-01 VITALS
HEART RATE: 65 BPM | DIASTOLIC BLOOD PRESSURE: 64 MMHG | BODY MASS INDEX: 25.2 KG/M2 | HEIGHT: 59 IN | WEIGHT: 125 LBS | SYSTOLIC BLOOD PRESSURE: 130 MMHG

## 2018-08-01 PROCEDURE — 99214 OFFICE O/P EST MOD 30 MIN: CPT | Mod: 25

## 2018-08-01 PROCEDURE — 93000 ELECTROCARDIOGRAM COMPLETE: CPT

## 2018-08-08 ENCOUNTER — APPOINTMENT (OUTPATIENT)
Dept: NEPHROLOGY | Facility: CLINIC | Age: 63
End: 2018-08-08
Payer: MEDICAID

## 2018-08-08 VITALS — WEIGHT: 128 LBS | BODY MASS INDEX: 25.85 KG/M2

## 2018-08-08 VITALS — DIASTOLIC BLOOD PRESSURE: 70 MMHG | SYSTOLIC BLOOD PRESSURE: 132 MMHG

## 2018-08-08 DIAGNOSIS — Z79.899 OTHER LONG TERM (CURRENT) DRUG THERAPY: ICD-10-CM

## 2018-08-08 PROCEDURE — 99214 OFFICE O/P EST MOD 30 MIN: CPT

## 2018-08-14 ENCOUNTER — MOBILE ON CALL (OUTPATIENT)
Age: 63
End: 2018-08-14

## 2018-09-12 ENCOUNTER — APPOINTMENT (OUTPATIENT)
Dept: HEART AND VASCULAR | Facility: CLINIC | Age: 63
End: 2018-09-12
Payer: MEDICAID

## 2018-09-12 PROCEDURE — 36415 COLL VENOUS BLD VENIPUNCTURE: CPT

## 2018-09-18 LAB
25(OH)D3 SERPL-MCNC: 33.6 NG/ML
ALBUMIN SERPL ELPH-MCNC: 4.1 G/DL
ALP BLD-CCNC: 68 U/L
ALT SERPL-CCNC: 36 U/L
ANION GAP SERPL CALC-SCNC: 12 MMOL/L
AST SERPL-CCNC: 32 U/L
BASOPHILS # BLD AUTO: 0.03 K/UL
BASOPHILS NFR BLD AUTO: 0.4 %
BILIRUB SERPL-MCNC: 0.6 MG/DL
BUN SERPL-MCNC: 13 MG/DL
CALCIUM SERPL-MCNC: 9.5 MG/DL
CHLORIDE SERPL-SCNC: 103 MMOL/L
CHOLEST SERPL-MCNC: 101 MG/DL
CHOLEST/HDLC SERPL: 2.3 RATIO
CO2 SERPL-SCNC: 24 MMOL/L
CREAT SERPL-MCNC: 1.13 MG/DL
EOSINOPHIL # BLD AUTO: 0.3 K/UL
EOSINOPHIL NFR BLD AUTO: 3.7 %
GLUCOSE SERPL-MCNC: 155 MG/DL
HBA1C MFR BLD HPLC: 7.6 %
HCT VFR BLD CALC: 32.7 %
HDLC SERPL-MCNC: 43 MG/DL
HGB BLD-MCNC: 10.4 G/DL
IMM GRANULOCYTES NFR BLD AUTO: 0.1 %
LDLC SERPL CALC-MCNC: 40 MG/DL
LYMPHOCYTES # BLD AUTO: 2.85 K/UL
LYMPHOCYTES NFR BLD AUTO: 35.5 %
MAN DIFF?: NORMAL
MCHC RBC-ENTMCNC: 26.9 PG
MCHC RBC-ENTMCNC: 31.8 GM/DL
MCV RBC AUTO: 84.7 FL
MONOCYTES # BLD AUTO: 0.44 K/UL
MONOCYTES NFR BLD AUTO: 5.5 %
NEUTROPHILS # BLD AUTO: 4.4 K/UL
NEUTROPHILS NFR BLD AUTO: 54.8 %
PLATELET # BLD AUTO: 262 K/UL
POTASSIUM SERPL-SCNC: 4.7 MMOL/L
PROT SERPL-MCNC: 6.7 G/DL
RBC # BLD: 3.86 M/UL
RBC # FLD: 16.2 %
SODIUM SERPL-SCNC: 140 MMOL/L
TRIGL SERPL-MCNC: 89 MG/DL
WBC # FLD AUTO: 8.03 K/UL

## 2018-10-17 ENCOUNTER — NON-APPOINTMENT (OUTPATIENT)
Age: 63
End: 2018-10-17

## 2018-10-17 ENCOUNTER — APPOINTMENT (OUTPATIENT)
Dept: HEART AND VASCULAR | Facility: CLINIC | Age: 63
End: 2018-10-17
Payer: MEDICAID

## 2018-10-17 VITALS — HEART RATE: 80 BPM

## 2018-10-17 VITALS
DIASTOLIC BLOOD PRESSURE: 66 MMHG | HEIGHT: 59 IN | SYSTOLIC BLOOD PRESSURE: 138 MMHG | WEIGHT: 128 LBS | BODY MASS INDEX: 25.8 KG/M2

## 2018-10-17 PROCEDURE — 99214 OFFICE O/P EST MOD 30 MIN: CPT | Mod: 25

## 2018-10-17 PROCEDURE — 93000 ELECTROCARDIOGRAM COMPLETE: CPT

## 2018-12-03 ENCOUNTER — APPOINTMENT (OUTPATIENT)
Dept: NEPHROLOGY | Facility: CLINIC | Age: 63
End: 2018-12-03

## 2019-06-19 ENCOUNTER — NON-APPOINTMENT (OUTPATIENT)
Age: 64
End: 2019-06-19

## 2019-06-19 ENCOUNTER — APPOINTMENT (OUTPATIENT)
Dept: HEART AND VASCULAR | Facility: CLINIC | Age: 64
End: 2019-06-19
Payer: MEDICAID

## 2019-06-19 VITALS
WEIGHT: 122 LBS | HEART RATE: 85 BPM | DIASTOLIC BLOOD PRESSURE: 70 MMHG | SYSTOLIC BLOOD PRESSURE: 138 MMHG | BODY MASS INDEX: 24.6 KG/M2 | HEIGHT: 59 IN

## 2019-06-19 PROCEDURE — 93000 ELECTROCARDIOGRAM COMPLETE: CPT

## 2019-06-19 PROCEDURE — 99214 OFFICE O/P EST MOD 30 MIN: CPT | Mod: 25

## 2019-06-19 RX ORDER — BUDESONIDE AND FORMOTEROL FUMARATE DIHYDRATE 80; 4.5 UG/1; UG/1
80-4.5 AEROSOL RESPIRATORY (INHALATION)
Refills: 0 | Status: ACTIVE | COMMUNITY

## 2019-06-19 RX ORDER — ALBUTEROL SULFATE 90 UG/1
108 (90 BASE) AEROSOL, METERED RESPIRATORY (INHALATION)
Refills: 0 | Status: ACTIVE | COMMUNITY

## 2019-06-19 NOTE — REVIEW OF SYSTEMS
[Fever] : no fever [Chills] : no chills [Mouth Sores] : no mouth sores [Shortness Of Breath] : no shortness of breath [Dyspnea on exertion] : not dyspnea during exertion [Chest Pain] : no chest pain [Lower Ext Edema] : no extremity edema [Palpitations] : no palpitations [Cough] : no cough [Abdominal Pain] : no abdominal pain [Heartburn] : no heartburn [Dysphagia] : no dysphagia [Dysuria] : no dysuria [Incontinence] : no incontinence [Muscle Cramps] : no muscle cramps [Skin Lesions] : no skin lesions [Skin: A Rash] : no rash: [Convulsions] : no convulsions [Dizziness] : no dizziness

## 2019-06-19 NOTE — DISCUSSION/SUMMARY
[FreeTextEntry1] : EKG: Normal Sinus  Rhythm 85/min\par \par 1. CAD - stable - cont ASA 81mg po daily\par 2. HLP - stable, cont meds.\par 3. HTN - stable, well controlled. Cont losartan 25mg po daily\par 4. DM - f/u with PMD \par 5. CKD -f/u with Dr. Kebede\par 6. Asthma - reschedule f/u with pulmonary\par 7. RTC 4 months. \par \par \par PCP: Flavia Lunsford MD\par Phone:[phone]\par Fax: [phone]\par

## 2019-06-19 NOTE — PHYSICAL EXAM
[General Appearance - Well Developed] : well developed [Normal Appearance] : normal appearance [General Appearance - Well Nourished] : well nourished [No Deformities] : no deformities [Normal Oropharynx] : normal oropharynx [Normal Jugular Venous V Waves Present] : normal jugular venous V waves present [Respiration, Rhythm And Depth] : normal respiratory rhythm and effort [Heart Rate And Rhythm] : heart rate and rhythm were normal [Heart Sounds] : normal S1 and S2 [Murmurs] : no murmurs present [Arterial Pulses Normal] : the arterial pulses were normal [Edema] : no peripheral edema present [Bowel Sounds] : normal bowel sounds [Abdomen Soft] : soft [Abdomen Tenderness] : non-tender [Nail Clubbing] : no clubbing of the fingernails [Cyanosis, Localized] : no localized cyanosis [Skin Turgor] : normal skin turgor [] : no rash

## 2019-06-19 NOTE — HISTORY OF PRESENT ILLNESS
[FreeTextEntry1] : Patient comes for f/u CAD, s/p PCI of LCx/RCA. No CP. No ischemia on stress test. Continues to walk 1 mile daily. pt started with symbicort and ventolin b/o chronc cough reportedly sec to asthma - lost f/u with pulmonary. Patient undergoing PT for right shoulder pain - pain cont to improve.

## 2019-06-25 ENCOUNTER — FORM ENCOUNTER (OUTPATIENT)
Age: 64
End: 2019-06-25

## 2019-06-25 DIAGNOSIS — J98.4 OTHER DISORDERS OF LUNG: ICD-10-CM

## 2019-06-25 LAB
ALBUMIN SERPL ELPH-MCNC: 4.3 G/DL
ALP BLD-CCNC: 83 U/L
ALT SERPL-CCNC: 14 U/L
ANION GAP SERPL CALC-SCNC: 13 MMOL/L
APPEARANCE: CLEAR
AST SERPL-CCNC: 12 U/L
BACTERIA: NEGATIVE
BASOPHILS # BLD AUTO: 0.05 K/UL
BASOPHILS NFR BLD AUTO: 0.5 %
BILIRUB SERPL-MCNC: 0.8 MG/DL
BILIRUBIN URINE: NEGATIVE
BLOOD URINE: NEGATIVE
BUN SERPL-MCNC: 14 MG/DL
CALCIUM SERPL-MCNC: 9.9 MG/DL
CHLORIDE SERPL-SCNC: 102 MMOL/L
CHOLEST SERPL-MCNC: 113 MG/DL
CHOLEST/HDLC SERPL: 2.7 RATIO
CO2 SERPL-SCNC: 27 MMOL/L
COLOR: COLORLESS
CREAT SERPL-MCNC: 1.13 MG/DL
EOSINOPHIL # BLD AUTO: 0.09 K/UL
EOSINOPHIL NFR BLD AUTO: 1 %
ESTIMATED AVERAGE GLUCOSE: 186 MG/DL
GLUCOSE QUALITATIVE U: NEGATIVE
GLUCOSE SERPL-MCNC: 119 MG/DL
HBA1C MFR BLD HPLC: 8.1 %
HCT VFR BLD CALC: 37.7 %
HDLC SERPL-MCNC: 42 MG/DL
HGB BLD-MCNC: 11.3 G/DL
HYALINE CASTS: 0 /LPF
IMM GRANULOCYTES NFR BLD AUTO: 0.2 %
KETONES URINE: NEGATIVE
LDLC SERPL CALC-MCNC: 45 MG/DL
LEUKOCYTE ESTERASE URINE: NEGATIVE
LYMPHOCYTES # BLD AUTO: 1.6 K/UL
LYMPHOCYTES NFR BLD AUTO: 17.4 %
MAN DIFF?: NORMAL
MCHC RBC-ENTMCNC: 26.3 PG
MCHC RBC-ENTMCNC: 30 GM/DL
MCV RBC AUTO: 87.9 FL
MICROSCOPIC-UA: NORMAL
MONOCYTES # BLD AUTO: 0.79 K/UL
MONOCYTES NFR BLD AUTO: 8.6 %
NEUTROPHILS # BLD AUTO: 6.64 K/UL
NEUTROPHILS NFR BLD AUTO: 72.3 %
NITRITE URINE: NEGATIVE
PH URINE: 5.5
PLATELET # BLD AUTO: 300 K/UL
POTASSIUM SERPL-SCNC: 3.9 MMOL/L
PROT SERPL-MCNC: 7 G/DL
PROTEIN URINE: NEGATIVE
RBC # BLD: 4.29 M/UL
RBC # FLD: 15.8 %
RED BLOOD CELLS URINE: 0 /HPF
SODIUM SERPL-SCNC: 142 MMOL/L
SPECIFIC GRAVITY URINE: 1
SQUAMOUS EPITHELIAL CELLS: 0 /HPF
T3 SERPL-MCNC: 105 NG/DL
T4 SERPL-MCNC: 8.5 UG/DL
TRIGL SERPL-MCNC: 129 MG/DL
TSH SERPL-ACNC: 1.18 UIU/ML
UROBILINOGEN URINE: NORMAL
WBC # FLD AUTO: 9.19 K/UL
WHITE BLOOD CELLS URINE: 0 /HPF

## 2019-06-26 ENCOUNTER — OUTPATIENT (OUTPATIENT)
Dept: OUTPATIENT SERVICES | Facility: HOSPITAL | Age: 64
LOS: 1 days | End: 2019-06-26
Payer: MEDICAID

## 2019-06-26 ENCOUNTER — APPOINTMENT (OUTPATIENT)
Dept: PULMONOLOGY | Facility: CLINIC | Age: 64
End: 2019-06-26
Payer: MEDICAID

## 2019-06-26 VITALS
BODY MASS INDEX: 25 KG/M2 | HEART RATE: 82 BPM | SYSTOLIC BLOOD PRESSURE: 120 MMHG | DIASTOLIC BLOOD PRESSURE: 60 MMHG | RESPIRATION RATE: 12 BRPM | WEIGHT: 124 LBS | OXYGEN SATURATION: 98 % | TEMPERATURE: 98.7 F | HEIGHT: 59 IN

## 2019-06-26 DIAGNOSIS — Z98.890 OTHER SPECIFIED POSTPROCEDURAL STATES: Chronic | ICD-10-CM

## 2019-06-26 DIAGNOSIS — Z95.5 PRESENCE OF CORONARY ANGIOPLASTY IMPLANT AND GRAFT: Chronic | ICD-10-CM

## 2019-06-26 PROCEDURE — 94729 DIFFUSING CAPACITY: CPT

## 2019-06-26 PROCEDURE — 71046 X-RAY EXAM CHEST 2 VIEWS: CPT | Mod: 26

## 2019-06-26 PROCEDURE — 94060 EVALUATION OF WHEEZING: CPT

## 2019-06-26 PROCEDURE — 99204 OFFICE O/P NEW MOD 45 MIN: CPT | Mod: 25

## 2019-06-26 PROCEDURE — 94727 GAS DIL/WSHOT DETER LNG VOL: CPT

## 2019-06-26 PROCEDURE — 71046 X-RAY EXAM CHEST 2 VIEWS: CPT

## 2019-06-26 NOTE — HISTORY OF PRESENT ILLNESS
[FreeTextEntry1] : 63F here for evaluation of subacute to chronic cough, occasionally with phlegm, white, and some associated REINA. Pt reports in 2016 she was dx with asthma after having chronic cough and PFTs revealed obstruction and BD response. She was placed on Symbicort and prn Ventolin and improved and has been well until the last 2-3 months, when her cough returned despite continuing on her inhalers. No wheezing, no phlegm, not clear if she had a URI at the onset of this - she was living in [facility] at this time. She denies any allergies, reports mild sinus congestion and mild GERD symptoms and was started on PPI recently. She has been on ARB for a while.

## 2019-06-26 NOTE — CONSULT LETTER
[Dear  ___] : Dear  [unfilled], [Consult Letter:] : I had the pleasure of evaluating your patient, [unfilled]. [Please see my note below.] : Please see my note below. [Consult Closing:] : Thank you very much for allowing me to participate in the care of this patient.  If you have any questions, please do not hesitate to contact me. [Sincerely,] : Sincerely, [FreeTextEntry3] : Awa De La Fuente MD, [facility]

## 2019-06-26 NOTE — ASSESSMENT
[FreeTextEntry1] : 63F with chronic cough. PFTs reveal no obstruction, mild restriction and moderate diffusion defect. CXR pending. I am inclined to pursue a CT of the chest to better evaluate chronic cough in a pt with lung restriction. Also ENT eval to r/o GERD with LPR.

## 2019-06-28 PROBLEM — J98.4 RESTRICTIVE LUNG DISEASE: Status: ACTIVE | Noted: 2019-06-28

## 2019-07-10 ENCOUNTER — APPOINTMENT (OUTPATIENT)
Dept: NEPHROLOGY | Facility: CLINIC | Age: 64
End: 2019-07-10
Payer: MEDICAID

## 2019-07-10 VITALS — BODY MASS INDEX: 25.08 KG/M2 | HEIGHT: 59 IN | WEIGHT: 124.38 LBS

## 2019-07-10 VITALS — HEART RATE: 64 BPM | DIASTOLIC BLOOD PRESSURE: 62 MMHG | SYSTOLIC BLOOD PRESSURE: 139 MMHG

## 2019-07-10 PROCEDURE — 99214 OFFICE O/P EST MOD 30 MIN: CPT

## 2019-07-10 NOTE — ASSESSMENT
[FreeTextEntry1] : # CKD stage c/w DM nephropathy.\par * The patient has been counseled that chronic kidney disease is a significant condition and regular office followup with me (at least every 4 months for now) is essential for monitoring, and that it is their responsibility to make a follow up appointment.\par * The patient has been counseled never to stop taking their medications without discussing it with me or another doctor.\par * The patient has been counseled on risk of acute renal failure and instructed to immediately call and speak with me or go immediately to ER with any severe symptoms, nausea, vomiting, diarrhea, chest pain, or shortness of breath.\par * The patient has been counseled on avoiding NSAIDs.\par * Reducing or avoiding red meat, following a relatively low oxalate diet, and starting folate 800 mg qd has been advised.\par * A counseling information sheet has been given and they were provided sufficient time to review this. All their questions were answered.\par \par # HTN borderline controlled.\par * Probable white coat component.\par * The patient's blood pressure was checked with the [employer] [id number]-[id number] using the SPRINT trial protocol after sitting quietly in an empty room with arm supported, back supported, and feet on the floor for 5 minutes. The average of 3 readings were taken. \par * The patient has been advised to check their BP at home with an automatic arm cuff, write down the readings, and reach me directly on the phone immediately if they are persistently > 180 systolic or if SBP is less than 100 or if lightheadedness develops. They were advised to bring in all blood pressure readings and medications next visit.\par * The patient has been counseled that they have a a significant medical condition and regular office followup (at least every 4 months for now) is essential for monitoring, and that it is their responsibility to make follow up appointments.\par * The patient also has been counseled that they must never stop or change any medications without discussing this with me (or another physician). \par * A counseling information sheet has been given and they were provided sufficient time to review this sheet. All their questions were answered.\par \par # DM uncontrolled.\par * Advised to follow up with endocrinologist for DM control.\par * Given lack of proteinuria, no indication for SGLT2 for DM nephropathy.

## 2019-07-10 NOTE — HISTORY OF PRESENT ILLNESS
[FreeTextEntry1] : Kindly referred by Dr. Neptali Mcguire for CKD.   Here with her , who was also provided counseling. \par \par * BP 120s with Dr. De La Fuente. 138 with Dr. Mcguire. BP at home 120s/80s. * DM botrderline uncontrolled, HGA1c 8.1. * No proteinuria on dipstick. * CKD stable, creatinine 1.13. \par \par Previous history (08Aug18): They were offered  and refused and they request that he translates.  pCKD stable, creatinine 1.17. * DM previously uncontrolled, HGA1c 8.1. * HTN controlled. No lightheadedness. Compliant with medications. Following low salt diet.

## 2019-07-10 NOTE — PHYSICAL EXAM
[General Appearance - Alert] : alert [General Appearance - In No Acute Distress] : in no acute distress [PERRL With Normal Accommodation] : pupils were equal in size, round, and reactive to light [Sclera] : the sclera and conjunctiva were normal [Extraocular Movements] : extraocular movements were intact [Outer Ear] : the ears and nose were normal in appearance [Neck Appearance] : the appearance of the neck was normal [Neck Cervical Mass (___cm)] : no neck mass was observed [Oropharynx] : the oropharynx was normal [Thyroid Nodule] : there were no palpable thyroid nodules [Thyroid Diffuse Enlargement] : the thyroid was not enlarged [Jugular Venous Distention Increased] : there was no jugular-venous distention [Heart Rate And Rhythm] : heart rate was normal and rhythm regular [Auscultation Breath Sounds / Voice Sounds] : lungs were clear to auscultation bilaterally [Heart Sounds Gallop] : no gallops [Heart Sounds] : normal S1 and S2 [Murmurs] : no murmurs [Heart Sounds Pericardial Friction Rub] : no pericardial rub [Veins - Varicosity Changes] : there were no varicosital changes [Bowel Sounds] : normal bowel sounds [Edema] : there was no peripheral edema [Abdomen Tenderness] : non-tender [Abdomen Soft] : soft [Cervical Lymph Nodes Enlarged Anterior Bilaterally] : anterior cervical [Cervical Lymph Nodes Enlarged Posterior Bilaterally] : posterior cervical [Supraclavicular Lymph Nodes Enlarged Bilaterally] : supraclavicular [Abdomen Mass (___ Cm)] : no abdominal mass palpated [Nail Clubbing] : no clubbing  or cyanosis of the fingernails [Abnormal Walk] : normal gait [Skin Color & Pigmentation] : normal skin color and pigmentation [Musculoskeletal - Swelling] : no joint swelling seen [Motor Tone] : muscle strength and tone were normal [] : no rash [Skin Turgor] : normal skin turgor [FreeTextEntry1] : no foot lesions [Affect] : the affect was normal [Oriented To Time, Place, And Person] : oriented to person, place, and time [Impaired Insight] : insight and judgment were intact

## 2019-07-31 ENCOUNTER — APPOINTMENT (OUTPATIENT)
Dept: OTOLARYNGOLOGY | Facility: CLINIC | Age: 64
End: 2019-07-31

## 2019-09-25 ENCOUNTER — APPOINTMENT (OUTPATIENT)
Dept: OTOLARYNGOLOGY | Facility: CLINIC | Age: 64
End: 2019-09-25
Payer: MEDICAID

## 2019-09-25 VITALS
HEART RATE: 80 BPM | DIASTOLIC BLOOD PRESSURE: 78 MMHG | HEIGHT: 59 IN | SYSTOLIC BLOOD PRESSURE: 129 MMHG | BODY MASS INDEX: 25.6 KG/M2 | WEIGHT: 127 LBS

## 2019-09-25 VITALS — TEMPERATURE: 98.1 F

## 2019-09-25 DIAGNOSIS — M26.69 OTHER SPECIFIED DISORDERS OF TEMPOROMANDIBULAR JOINT: ICD-10-CM

## 2019-09-25 DIAGNOSIS — Z87.19 PERSONAL HISTORY OF OTHER DISEASES OF THE DIGESTIVE SYSTEM: ICD-10-CM

## 2019-09-25 DIAGNOSIS — R05 COUGH: ICD-10-CM

## 2019-09-25 DIAGNOSIS — H92.01 OTALGIA, RIGHT EAR: ICD-10-CM

## 2019-09-25 DIAGNOSIS — K21.0 GASTRO-ESOPHAGEAL REFLUX DISEASE WITH ESOPHAGITIS: ICD-10-CM

## 2019-09-25 PROCEDURE — 31575 DIAGNOSTIC LARYNGOSCOPY: CPT

## 2019-09-25 PROCEDURE — 99204 OFFICE O/P NEW MOD 45 MIN: CPT | Mod: 25

## 2019-10-16 ENCOUNTER — APPOINTMENT (OUTPATIENT)
Dept: HEART AND VASCULAR | Facility: CLINIC | Age: 64
End: 2019-10-16

## 2019-11-13 ENCOUNTER — APPOINTMENT (OUTPATIENT)
Dept: NEPHROLOGY | Facility: CLINIC | Age: 64
End: 2019-11-13

## 2019-12-18 NOTE — ASU PATIENT PROFILE, ADULT - ALCOHOL USE HISTORY SINGLE SELECT
Date of Service:  12/18/2019    Chief Complaint:  Urinary frequency     History of Present Illness:  I'm seeing patient Adrien Joe at the request of Kirill Stewart DO in consultation regarding urinary frequency. He has noticed increased frequency with little urine output over the past couple of years that is becoming more bothersome. His urinary stream is also weaker and at times he is not able to empty his bladder. His nocturia is typically 3 times a night depending on fluid intake. He denies fevers, chills or dysuria. He denies any gross hematuria or recurrent infections. He denies any history kidney stones or kidney infections. He is a nonsmoker. He has no family history of kidney, bladder, or prostate cancers, infections, or stones. His most recent prostate specific antigen level is 0.90 from 1/4/2019.    I ordered and reviewed a flow and residual today which revealed a partially-obstructive voiding pattern, with a peak flow of 9 mL/second with a voided volume of 107 mL, and a residual of 38 mL.     PSA Test Results:  Lab Results   Component Value Date    PSA 0.90 11/04/2019    PSA 0.84 06/12/2017       Past Medical History:   Past Medical History:   Diagnosis Date   • Localized osteoarthritis of left knee    • Sleep disorder, shift-work        Surgical History:  Past Surgical History:   Procedure Laterality Date   • Inguinal hernia repair Bilateral 11/12/2019    Flavio Hernández   :    Family History:  Family history of G.U. (Genitourinary) Malignancy - no  Family History   Problem Relation Age of Onset   • Diabetes Maternal Aunt    • Cancer Maternal Grandmother 68        colon cancer   • Heart disease Maternal Grandfather    • Coronary Artery Disease Maternal Grandfather        Social History:  Social History     Socioeconomic History   • Marital status: Single     Spouse name: Not on file   • Number of children: Not on file   • Years of education: Not on file   • Highest education level: Not on file    Occupational History   • Not on file   Social Needs   • Financial resource strain: Not on file   • Food insecurity:     Worry: Not on file     Inability: Not on file   • Transportation needs:     Medical: Not on file     Non-medical: Not on file   Tobacco Use   • Smoking status: Current Every Day Smoker     Packs/day: 0.29     Years: 17.00     Pack years: 4.93     Types: Cigarettes     Start date: 2/26/2000   • Smokeless tobacco: Never Used   • Tobacco comment: stopped smoking for surgery 11.12.19   Substance and Sexual Activity   • Alcohol use: No     Frequency: Never   • Drug use: No   • Sexual activity: Not Currently     Partners: Female   Lifestyle   • Physical activity:     Days per week: 0 days     Minutes per session: 0 min   • Stress: Only a little   Relationships   • Social connections:     Talks on phone: Never     Gets together: Never     Attends [employer] service: More than 4 times per year     Active member of club or organization: No     Attends meetings of clubs or organizations: Never     Relationship status: Never    • Intimate partner violence:     Fear of current or ex partner: Not on file     Emotionally abused: Not on file     Physically abused: Not on file     Forced sexual activity: Not on file   Other Topics Concern   • Not on file   Social History Narrative    Patient works at [employer] as a  (fixes, adjust things). Patient lives in a home with friend and his friend's boyfriend,  With 2 dogs (rotewiler and pitbull).        Allergies:  ALLERGIES:  No Known Allergies    Medications:  Current Outpatient Medications   Medication Sig Dispense Refill   • tamsulosin (FLOMAX) 0.4 MG Cap Take 1 capsule by mouth daily after a meal. ONE HALF HOUR AFTER EVENING MEAL 30 capsule 1   • ondansetron (ZOFRAN) 4 MG tablet Take one tablet at 4 pm and one tablet at 8 pm. 2 tablet 0   • HYDROcodone-acetaminophen (NORCO) 5-325 MG per tablet Take 1-2 tablets by mouth every 6 hours as  needed for Pain. 20 tablet 0   • Melatonin 10 MG Cap Take 10 mg by mouth at bedtime as needed (insomnia). 30 capsule    • buPROPion (WELLBUTRIN SR) 150 MG 12 hr tablet Take 1 tablet by mouth 2 times daily. 60 tablet 3   • meloxicam (MOBIC) 15 MG tablet Take 1 tablet by mouth daily. 30 tablet 1     No current facility-administered medications for this visit.        Allergies, Medications, Past Medical History, Past Surgical History, Family History, and Social History were reviewed today.    Review of Systems:    Denies fever and chills.  Complains of benign prostatic hypertrophy with obstruction.  All other systems have been reviewed and are negative.    Physical Exam:    Constitutional:  Visit Vitals  /81   Pulse 70   Resp 14   Ht 5' 9\" (1.753 m)   Wt 85.7 kg   BMI 27.91 kg/m²     Well developed, well nourished, and afebrile.    Psychiatric:  Alert and oriented x3.  Normal mood and affect.    Eyes:  Conjunctivae and lids are normal.  Pupils and irises are equal and reactive to light.    HEENT:  External inspection of ears and nose reveals no gross abnormalities.      Respiratory:  No abnormal respiratory effort or wheezing.    Cardiovascular:  No significant peripheral edema of lower extremities.    Musculoskeletal:  Normal gait and station.    Skin:  Warm and dry with no lesions or induration.    Genitourinary:  Normal phallus, adequate meatus.  Scrotum normal.  Testicles descended.  Right testicle normal size, shape and consistency.  Right epididymis normal.  Left testicle normal size, shape and consistency.  Left epididymis normal.  Normal perineum.  Digital Rectal Exam:  Normal anus.  Prostate 1+ enlarged.  Nodule Absent.      Record Review:    I met with patient Adrien Joe and I obtained pertinent history.    Reviewed tests:  Lab tests reviewed    Assessment and Plan:  Therefore, we discussed where to go from here.    For his benign prostatic hypertrophy with obstruction- I am starting him on Flomax  0.4 mg daily for medical management. He should follow up in 1 month with a flow and postvoid residual at his visit. He agrees with the plan and is pleased.             On 12/18/2019, ITila scribed the services personally performed by Alex Bass MD   The documentation recorded by the scribe accurately and completely reflects the service(s) I personally performed and the decisions made by me.          never

## 2020-04-16 PROBLEM — K21.0 GASTRO-ESOPHAGEAL REFLUX DISEASE WITH ESOPHAGITIS: Status: ACTIVE | Noted: 2020-04-16

## 2020-04-16 NOTE — PROCEDURE
[de-identified] : \par Indication:  reflux\par -Verbal consent was obtained from patient prior to procedure.\par Flexible laryngoscopy was performed via mouth and revealed the following:\par   -- Base of tongue was symmetric and not enlarged.\par   -- Vallecula was clear\par   -- Epiglottis, both aryepiglottic folds and both false vocal folds were normal\par   -- Arytenoids both with mild  edema and no erythema \par   -- True vocal folds were fully mobile and without lesions. \par   -- Post cricoid area was clear.\par   -- Interarytenoid edema was present   \par \par The patient tolerated the procedure well.\par

## 2020-04-16 NOTE — PHYSICAL EXAM
[Midline] : trachea located in midline position [Laryngoscopy Performed] : laryngoscopy was performed, see procedure section for findings [Normal] : no rashes [FreeTextEntry1] : No hoarseness. [de-identified] : Flat molar surfaces upper and lower. [de-identified] : Carotid pulses 2+ bilateral.

## 2020-04-16 NOTE — ASSESSMENT
[FreeTextEntry1] : Ms. 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  She has flat molar surfaces\par There is no  click today.\par \par PLAN:\par -- reflux precautions reviewed\par -- TMJ precautions reviewed with her\par -- F/up with her dentist re nightguard\par \par Return as needed\par

## 2020-04-16 NOTE — HISTORY OF PRESENT ILLNESS
[de-identified] : I was pleased to evaluate this 64 year old woman who was referred by Dr. De La Fuente for cough.\par She was accompanied by her , who also contributed to history.\par Communication was facilitated by language line Michael # [id number].\par \par Cough now gone for 1-2 months now.  No throat complaints.\par When Dr. De La Fuente saw her in late June, she had cough x 2-3 months.\par No trouble swallowing, voice or breathing problems now.\par No postnasal drip.  Occasional heartburn.\par \par Pain in right ear, lasts about a minutes, like an electric shock, about once/month x a few months; last was 20 days ago.\par No injury to ear/head, HL, tinnitus, vertigo.\par Denies grinding teeth.\par

## 2020-07-08 ENCOUNTER — APPOINTMENT (OUTPATIENT)
Dept: HEART AND VASCULAR | Facility: CLINIC | Age: 65
End: 2020-07-08
Payer: MEDICAID

## 2020-07-08 VITALS
TEMPERATURE: 98.3 F | DIASTOLIC BLOOD PRESSURE: 64 MMHG | HEIGHT: 59 IN | BODY MASS INDEX: 25 KG/M2 | HEART RATE: 71 BPM | WEIGHT: 124 LBS | SYSTOLIC BLOOD PRESSURE: 130 MMHG

## 2020-07-08 PROCEDURE — 93000 ELECTROCARDIOGRAM COMPLETE: CPT

## 2020-07-08 PROCEDURE — 99214 OFFICE O/P EST MOD 30 MIN: CPT | Mod: 25

## 2020-07-08 NOTE — REVIEW OF SYSTEMS
[Fever] : no fever [Chills] : no chills [Dyspnea on exertion] : not dyspnea during exertion [Mouth Sores] : no mouth sores [Shortness Of Breath] : no shortness of breath [Lower Ext Edema] : no extremity edema [Palpitations] : no palpitations [Chest Pain] : no chest pain [Heartburn] : no heartburn [Cough] : no cough [Abdominal Pain] : no abdominal pain [Dysphagia] : no dysphagia [Dysuria] : no dysuria [Incontinence] : no incontinence [Muscle Cramps] : no muscle cramps [Skin Lesions] : no skin lesions [Skin: A Rash] : no rash: [Dizziness] : no dizziness [Convulsions] : no convulsions

## 2020-07-08 NOTE — DISCUSSION/SUMMARY
[FreeTextEntry1] : EKG:Normal Sinus  Rhythm 71/min\par \par 1. CAD - stable - cont ASA 81mg po daily - cont exercise as tolerate\par 2. HLP - stable, cont meds.\par 3. HTN - stable, well controlled. Cont losartan 25mg po daily\par 4. DM - f/u with PMD \par 5. CKD -f/u with Dr. Kebede\par 6. Asthma -inhalers PRN\par 7. Labs today\par 8. RTC 4 months. \par \par \par PCP: Flavia Lunsford MD\par Phone:[phone]\par Fax: [phone]\par email: [email or web address]\par

## 2020-07-08 NOTE — HISTORY OF PRESENT ILLNESS
[FreeTextEntry1] : Patient comes for f/u CAD, s/p PCI of LCx/RCA. No CP.  Continues to walk 1 mile daily - less lately b/o Covid. Inhalers for asthma only PRN- lost f/u with pulmonary. Right shoulder pain subsided.\par  \par

## 2020-07-08 NOTE — PHYSICAL EXAM
[General Appearance - Well Developed] : well developed [General Appearance - Well Nourished] : well nourished [Normal Appearance] : normal appearance [Normal Jugular Venous V Waves Present] : normal jugular venous V waves present [Normal Oropharynx] : normal oropharynx [No Deformities] : no deformities [Heart Rate And Rhythm] : heart rate and rhythm were normal [Heart Sounds] : normal S1 and S2 [Murmurs] : no murmurs present [Arterial Pulses Normal] : the arterial pulses were normal [Edema] : no peripheral edema present [Respiration, Rhythm And Depth] : normal respiratory rhythm and effort [Bowel Sounds] : normal bowel sounds [Abdomen Tenderness] : non-tender [Abdomen Soft] : soft [Skin Turgor] : normal skin turgor [Nail Clubbing] : no clubbing of the fingernails [Cyanosis, Localized] : no localized cyanosis [] : no rash

## 2020-07-09 LAB
ALBUMIN SERPL ELPH-MCNC: 4.4 G/DL
ALP BLD-CCNC: 74 U/L
ALT SERPL-CCNC: 14 U/L
ANION GAP SERPL CALC-SCNC: 15 MMOL/L
AST SERPL-CCNC: 22 U/L
BASOPHILS # BLD AUTO: 0.06 K/UL
BASOPHILS NFR BLD AUTO: 0.7 %
BILIRUB SERPL-MCNC: 0.5 MG/DL
BUN SERPL-MCNC: 18 MG/DL
CALCIUM SERPL-MCNC: 9.5 MG/DL
CHLORIDE SERPL-SCNC: 104 MMOL/L
CHOLEST SERPL-MCNC: 102 MG/DL
CHOLEST/HDLC SERPL: 2.2 RATIO
CO2 SERPL-SCNC: 23 MMOL/L
CREAT SERPL-MCNC: 1.26 MG/DL
EOSINOPHIL # BLD AUTO: 0.23 K/UL
EOSINOPHIL NFR BLD AUTO: 2.6 %
ESTIMATED AVERAGE GLUCOSE: 174 MG/DL
GLUCOSE SERPL-MCNC: 54 MG/DL
HBA1C MFR BLD HPLC: 7.7 %
HCT VFR BLD CALC: 36.3 %
HDLC SERPL-MCNC: 46 MG/DL
HGB BLD-MCNC: 10.7 G/DL
IMM GRANULOCYTES NFR BLD AUTO: 0.3 %
LDLC SERPL CALC-MCNC: 36 MG/DL
LYMPHOCYTES # BLD AUTO: 2.58 K/UL
LYMPHOCYTES NFR BLD AUTO: 29.2 %
MAN DIFF?: NORMAL
MCHC RBC-ENTMCNC: 25.5 PG
MCHC RBC-ENTMCNC: 29.5 GM/DL
MCV RBC AUTO: 86.6 FL
MONOCYTES # BLD AUTO: 0.63 K/UL
MONOCYTES NFR BLD AUTO: 7.1 %
NEUTROPHILS # BLD AUTO: 5.31 K/UL
NEUTROPHILS NFR BLD AUTO: 60.1 %
PLATELET # BLD AUTO: 280 K/UL
POTASSIUM SERPL-SCNC: 4.9 MMOL/L
PROT SERPL-MCNC: 6.9 G/DL
RBC # BLD: 4.19 M/UL
RBC # FLD: 18.6 %
SARS-COV-2 IGG SERPL IA-ACNC: <0.1 INDEX
SARS-COV-2 IGG SERPL QL IA: NEGATIVE
SODIUM SERPL-SCNC: 141 MMOL/L
TRIGL SERPL-MCNC: 96 MG/DL
WBC # FLD AUTO: 8.84 K/UL

## 2020-07-12 LAB — TSH SERPL-ACNC: 3.36 UIU/ML

## 2020-09-24 ENCOUNTER — APPOINTMENT (OUTPATIENT)
Dept: NEPHROLOGY | Facility: CLINIC | Age: 65
End: 2020-09-24
Payer: MEDICAID

## 2020-09-24 VITALS — HEART RATE: 73 BPM | SYSTOLIC BLOOD PRESSURE: 123 MMHG | DIASTOLIC BLOOD PRESSURE: 66 MMHG

## 2020-09-24 VITALS — WEIGHT: 128.2 LBS | BODY MASS INDEX: 25.89 KG/M2

## 2020-09-24 DIAGNOSIS — I10 ESSENTIAL (PRIMARY) HYPERTENSION: ICD-10-CM

## 2020-09-24 PROCEDURE — 36415 COLL VENOUS BLD VENIPUNCTURE: CPT

## 2020-09-24 PROCEDURE — 99214 OFFICE O/P EST MOD 30 MIN: CPT | Mod: 25

## 2020-09-24 NOTE — PHYSICAL EXAM
[General Appearance - Alert] : alert [General Appearance - In No Acute Distress] : in no acute distress [Sclera] : the sclera and conjunctiva were normal [PERRL With Normal Accommodation] : pupils were equal in size, round, and reactive to light [Extraocular Movements] : extraocular movements were intact [Outer Ear] : the ears and nose were normal in appearance [Neck Appearance] : the appearance of the neck was normal [Neck Cervical Mass (___cm)] : no neck mass was observed [Jugular Venous Distention Increased] : there was no jugular-venous distention [Thyroid Diffuse Enlargement] : the thyroid was not enlarged [Thyroid Nodule] : there were no palpable thyroid nodules [Auscultation Breath Sounds / Voice Sounds] : lungs were clear to auscultation bilaterally [Heart Rate And Rhythm] : heart rate was normal and rhythm regular [Heart Sounds] : normal S1 and S2 [Heart Sounds Gallop] : no gallops [Murmurs] : no murmurs [Heart Sounds Pericardial Friction Rub] : no pericardial rub [Edema] : there was no peripheral edema [Veins - Varicosity Changes] : there were no varicosital changes [Bowel Sounds] : normal bowel sounds [Abdomen Soft] : soft [Abdomen Tenderness] : non-tender [Abdomen Mass (___ Cm)] : no abdominal mass palpated [Cervical Lymph Nodes Enlarged Posterior Bilaterally] : posterior cervical [Cervical Lymph Nodes Enlarged Anterior Bilaterally] : anterior cervical [Supraclavicular Lymph Nodes Enlarged Bilaterally] : supraclavicular [Abnormal Walk] : normal gait [Nail Clubbing] : no clubbing  or cyanosis of the fingernails [Musculoskeletal - Swelling] : no joint swelling seen [Motor Tone] : muscle strength and tone were normal [Skin Color & Pigmentation] : normal skin color and pigmentation [Skin Turgor] : normal skin turgor [] : no rash [Oriented To Time, Place, And Person] : oriented to person, place, and time [Impaired Insight] : insight and judgment were intact [Affect] : the affect was normal

## 2020-09-24 NOTE — HISTORY OF PRESENT ILLNESS
[FreeTextEntry1] : Kindly referred by Dr. Neptali Mcguire for CKD.   Here with her , who was also provided counseling. An interpeter was offered and they declined. Her  is translating. \par \par * HTN borderline controlled,  with Dr. Mcguire. * CKD previously stable, creatinine 1.26 (eGFR 45). DM uncontrolled, HGA1c 7.7. \par \par Previous history (10Jul19): * BP 120s with Dr. De La Fuente. 138 with Dr. Mcguire. BP at home 120s/80s. * DM botrderline uncontrolled, HGA1c 8.1. * No proteinuria on dipstick. * CKD stable, creatinine 1.13. \par \par Previous history (08Aug18): They were offered  and refused and they request that he translates.  pCKD stable, creatinine 1.17. * DM previously uncontrolled, HGA1c 8.1. * HTN controlled. No lightheadedness. Compliant with medications. Following low salt diet.

## 2020-09-24 NOTE — ASSESSMENT
[FreeTextEntry1] : # CKD stage 3 c/w DM nephropathy. \par * Recheck labs. \par * The patient has been counseled that chronic kidney disease is a significant condition and regular office followup with me (at least every 4 months for now) is essential for monitoring, and that it is their responsibility to make a follow up appointment.\par * A counseling information sheet has been given (currently or previously, in-person or electronically). All their questions were answered.\par * The patient has been counseled never to stop taking their medications without discussing it with me or another doctor.\par * The patient has been counseled on risk of acute renal failure and instructed to immediately call and speak with me or go immediately to ER with any severe symptoms, nausea, vomiting, diarrhea, chest pain, or shortness of breath.\par * The patient has been counseled on avoiding NSAIDs.\par \par # HTN controlled.\par * Cont metoprolol and losartan. \par * The patient's blood pressure was checked with the [employer] [id number] using the SPRINT trial protocol after sitting quietly in an empty room with arm supported, back supported, and feet on the floor for 5 minutes. The average of 3 readings were taken.\par * A counseling information sheet has been given (currently or previously, in-person or electronically). All their questions were answered.\par * The patient has been counseled to check their BP at home with an automatic arm cuff, write down the readings, and reach me directly on the phone immediately if they are persistently > 180 systolic or if SBP is less than 100 or if lightheadedness develops. They were counseled to bring in all blood pressure readings and medications next visit.\par * The patient has been counseled that they have a a significant medical condition and regular office followup (at least every 4 months for now) is essential for monitoring, and that it is their responsibility to make follow up appointments.\par * The patient also has been counseled that they must never stop or change any medications without discussing this with me (or another physician). \par \par * The patient has been counseled that they have a a significant medical condition and regular office followup (at least every 4 months for now) is essential for monitoring, and that it is their responsibility to make follow up appointments.\par * The patient also has been counseled that they must never stop or change any medications without discussing this with me (or another physician). \par \par # DM uncontrolled. \par * Advised to follow up with endocrinologist for DM control.\par * Given lack of proteinuria, no indication for SGLT2 for DM nephropathy.

## 2020-09-30 LAB
25(OH)D3 SERPL-MCNC: 41.6 NG/ML
ALBUMIN SERPL ELPH-MCNC: 4.2 G/DL
ALP BLD-CCNC: 81 U/L
ALT SERPL-CCNC: 20 U/L
ANION GAP SERPL CALC-SCNC: 16 MMOL/L
APPEARANCE: CLEAR
AST SERPL-CCNC: 25 U/L
BASOPHILS # BLD AUTO: 0.07 K/UL
BASOPHILS NFR BLD AUTO: 0.7 %
BILIRUB SERPL-MCNC: 0.4 MG/DL
BILIRUBIN URINE: NEGATIVE
BLOOD URINE: NEGATIVE
BUN SERPL-MCNC: 16 MG/DL
CALCIUM SERPL-MCNC: 10 MG/DL
CALCIUM SERPL-MCNC: 10 MG/DL
CHLORIDE SERPL-SCNC: 103 MMOL/L
CHOLEST SERPL-MCNC: 95 MG/DL
CHOLEST/HDLC SERPL: 2.2 RATIO
CO2 SERPL-SCNC: 23 MMOL/L
COLOR: NORMAL
CREAT SERPL-MCNC: 1.23 MG/DL
CREAT SPEC-SCNC: 72 MG/DL
CREAT SPEC-SCNC: 72 MG/DL
CREAT/PROT UR: 0.1 RATIO
EOSINOPHIL # BLD AUTO: 0.21 K/UL
EOSINOPHIL NFR BLD AUTO: 2.1 %
FERRITIN SERPL-MCNC: 44 NG/ML
GLUCOSE QUALITATIVE U: NEGATIVE
GLUCOSE SERPL-MCNC: 48 MG/DL
HCT VFR BLD CALC: 36.6 %
HDLC SERPL-MCNC: 43 MG/DL
HGB BLD-MCNC: 11.2 G/DL
IMM GRANULOCYTES NFR BLD AUTO: 0.2 %
KETONES URINE: NEGATIVE
LDLC SERPL CALC-MCNC: 33 MG/DL
LEUKOCYTE ESTERASE URINE: NEGATIVE
LYMPHOCYTES # BLD AUTO: 2.95 K/UL
LYMPHOCYTES NFR BLD AUTO: 29.4 %
MAGNESIUM SERPL-MCNC: 2 MG/DL
MAN DIFF?: NORMAL
MCHC RBC-ENTMCNC: 26.4 PG
MCHC RBC-ENTMCNC: 30.6 GM/DL
MCV RBC AUTO: 86.1 FL
MICROALBUMIN 24H UR DL<=1MG/L-MCNC: <1.2 MG/DL
MICROALBUMIN/CREAT 24H UR-RTO: NORMAL MG/G
MONOCYTES # BLD AUTO: 0.77 K/UL
MONOCYTES NFR BLD AUTO: 7.7 %
NEUTROPHILS # BLD AUTO: 6 K/UL
NEUTROPHILS NFR BLD AUTO: 59.9 %
NITRITE URINE: NEGATIVE
PARATHYROID HORMONE INTACT: 22 PG/ML
PH URINE: 5.5
PHOSPHATE SERPL-MCNC: 3.3 MG/DL
PLATELET # BLD AUTO: 281 K/UL
POTASSIUM SERPL-SCNC: 4.5 MMOL/L
PROT SERPL-MCNC: 6.9 G/DL
PROT UR-MCNC: 7 MG/DL
PROTEIN URINE: NEGATIVE
RBC # BLD: 4.25 M/UL
RBC # FLD: 15.7 %
SODIUM SERPL-SCNC: 142 MMOL/L
SPECIFIC GRAVITY URINE: 1.01
TRIGL SERPL-MCNC: 93 MG/DL
TSH SERPL-ACNC: 3.07 UIU/ML
UROBILINOGEN URINE: NORMAL
VIT B12 SERPL-MCNC: 1492 PG/ML
WBC # FLD AUTO: 10.02 K/UL

## 2020-10-14 ENCOUNTER — APPOINTMENT (OUTPATIENT)
Dept: HEART AND VASCULAR | Facility: CLINIC | Age: 65
End: 2020-10-14

## 2021-08-02 NOTE — ED PROVIDER NOTE - SECONDARY DIAGNOSIS.
Jigna Rodríguez prior Vic Henry     UXPVSD:[id number]; Status: Approved   Coverage Start 07/03/2021;  Coverage End Date:01/29/2022;
Weakness

## 2021-09-20 ENCOUNTER — FORM ENCOUNTER (OUTPATIENT)
Age: 66
End: 2021-09-20

## 2021-09-21 ENCOUNTER — TRANSCRIPTION ENCOUNTER (OUTPATIENT)
Age: 66
End: 2021-09-21

## 2021-09-22 ENCOUNTER — OUTPATIENT (OUTPATIENT)
Dept: INPATIENT UNIT | Facility: HOSPITAL | Age: 66
LOS: 1 days | End: 2021-09-22
Payer: COMMERCIAL

## 2021-09-22 ENCOUNTER — APPOINTMENT (OUTPATIENT)
Dept: DISASTER EMERGENCY | Facility: HOSPITAL | Age: 66
End: 2021-09-22

## 2021-09-22 VITALS
SYSTOLIC BLOOD PRESSURE: 120 MMHG | RESPIRATION RATE: 17 BRPM | HEART RATE: 76 BPM | DIASTOLIC BLOOD PRESSURE: 78 MMHG | TEMPERATURE: 98 F | OXYGEN SATURATION: 100 %

## 2021-09-22 VITALS
WEIGHT: 121.25 LBS | HEART RATE: 87 BPM | RESPIRATION RATE: 18 BRPM | TEMPERATURE: 99 F | HEIGHT: 60 IN | DIASTOLIC BLOOD PRESSURE: 74 MMHG | SYSTOLIC BLOOD PRESSURE: 130 MMHG | OXYGEN SATURATION: 99 %

## 2021-09-22 DIAGNOSIS — Z95.5 PRESENCE OF CORONARY ANGIOPLASTY IMPLANT AND GRAFT: Chronic | ICD-10-CM

## 2021-09-22 DIAGNOSIS — Z98.890 OTHER SPECIFIED POSTPROCEDURAL STATES: Chronic | ICD-10-CM

## 2021-09-22 DIAGNOSIS — U07.1 COVID-19: ICD-10-CM

## 2021-09-22 PROCEDURE — M0243: CPT

## 2021-09-22 RX ORDER — SODIUM CHLORIDE 9 MG/ML
250 INJECTION INTRAMUSCULAR; INTRAVENOUS; SUBCUTANEOUS
Refills: 0 | Status: COMPLETED | OUTPATIENT
Start: 2021-09-22 | End: 2021-09-22

## 2021-09-22 RX ADMIN — SODIUM CHLORIDE 310 MILLILITER(S): 9 INJECTION INTRAMUSCULAR; INTRAVENOUS; SUBCUTANEOUS at 09:04

## 2021-09-22 NOTE — CHART NOTE - NSCHARTNOTEFT_GEN_A_CORE
CC: Monoclonal Antibody Infusion/COVID 19 Positive on 9/21/21      History: Patient presents for infusion of monoclonal antibody infusion. Patient has been screened and was deemed to be a candidate.    Symptoms/ Criteria: Fever, cough, malaise, HA    Risk Profile includes: HTN, DM, CAD-MI, BMI >25    casirivimab/imdevimab in sodium chloride 0.9% (EUA) IVPB 100 milliLiter(s) IV Intermittent once  sodium chloride 0.9%. 250 milliLiter(s) IV Continuous <Continuous>      PMHx:  Infection due to severe acute respiratory syndrome coronavirus 2 (SARS-CoV-2)    Gastroesophageal reflux disease    Hyperlipidemia    Essential hypertension    Type 2 diabetes mellitus    Stented coronary artery    CAD (coronary artery disease)    Cholelithiasis    Diabetes mellitus    NSTEMI (non-ST elevated myocardial infarction)    Hypertension    GERD (gastroesophageal reflux disease)    Disorder of lipid metabolism    MI (myocardial infarction)    CAD (coronary artery disease)    History of surgery    Stented coronary artery    History of surgery          T(C): 37.2 (09-22-21 @ 08:39), Max: 37.2 (09-22-21 @ 08:39)  HR: 87 (09-22-21 @ 08:39) (87 - 87)  BP: 130/74 (09-22-21 @ 08:39) (130/74 - 130/74)  RR: 18 (09-22-21 @ 08:39) (18 - 18)  SpO2: 99% (09-22-21 @ 08:39) (99% - 99%)      PE:   Appearance: NAD	  HEENT:   Normal oral mucosa.   Lymphatic: No lymphadenopathy  Cardiovascular: Normal S1 S2, No JVD, No murmurs, No edema  Respiratory: Lungs clear to auscultation	  Gastrointestinal:  Soft, Non-tender. No guarding   Skin: warm and dry  Neurologic: Non-focal  Extremities: Normal range of motion.     ASSESSMENT:  Pt is a 65y year old Female Covid +  referred to the infusion center for Monoclonal antibody infusion (Regeneron).  Pt states she was fully vaccinated with Moderna -last dose-4/2021-COnsent obtined via -Aleksandra # [id number]      PLAN:  - infusion procedure explained to patient   - Consent for monoclonal antibody infusion obtained   - Risk & benefits discussed/all questions answered  - infuse Regeneron over 21 minutes  - will observe patient for one hour post infusion  and then if stable discharge home with outpt follow up as planned by PMD.    POST INFUSION ASSESSMENT:   DISCHARGE at approximately  1  hour post infusion    - Patient tolerated infusion well denies complaints of chest pain/SOB/dizziness/ palps  - VSS for discharge home  - D/C instructions given/ fact sheet included.  - Patient to follow-up with PCP as needed.

## 2021-09-23 ENCOUNTER — TRANSCRIPTION ENCOUNTER (OUTPATIENT)
Age: 66
End: 2021-09-23

## 2021-11-09 NOTE — BRIEF OPERATIVE NOTE - PRE-OP DX
Kenny Roe is a 55 Marko Mendez MD   varenicline (CHANTIX STARTER DENNIS) [facility] [address]     Dawn Pinzon MD at [address] HX GYN      c/s x2    HX HEENT      tonsillectomy as a child    HX LUMBAR FUSION  8/24/14    Dr Bernie Agrawal  2007    L5-S1, Dr. Bernie Agrawal  8/24/14    L4-5    GA ABDOMEN SURGERY [address]    cholycystectomy    GA [address] and [place] [address] [facility] authority and the [employer] Act, this Virtual Visit was conducted, with patient's consent, to reduce the patient's risk of exposure to COVID-19 and provide continuity of care for an established patient. Services were provided through a video synchronous discussion virtually to substitute for in-person clinic visit.      Written by glenn Cedillo, as dictated by Sheyla Upton M.D. Cholelithiases  04/06/2017    Active  Celina Sharma Calculus of gallbladder with chronic cholecystitis without obstruction  04/06/2017    Active  Nava Prince

## 2021-11-10 ENCOUNTER — APPOINTMENT (OUTPATIENT)
Dept: HEART AND VASCULAR | Facility: CLINIC | Age: 66
End: 2021-11-10
Payer: MEDICAID

## 2021-11-10 VITALS
WEIGHT: 126 LBS | HEART RATE: 72 BPM | HEIGHT: 59 IN | TEMPERATURE: 97.2 F | BODY MASS INDEX: 25.4 KG/M2 | SYSTOLIC BLOOD PRESSURE: 134 MMHG | DIASTOLIC BLOOD PRESSURE: 72 MMHG

## 2021-11-10 DIAGNOSIS — R06.00 DYSPNEA, UNSPECIFIED: ICD-10-CM

## 2021-11-10 DIAGNOSIS — E11.9 TYPE 2 DIABETES MELLITUS W/OUT COMPLICATIONS: ICD-10-CM

## 2021-11-10 DIAGNOSIS — R07.9 CHEST PAIN, UNSPECIFIED: ICD-10-CM

## 2021-11-10 DIAGNOSIS — N18.9 CHRONIC KIDNEY DISEASE, UNSPECIFIED: ICD-10-CM

## 2021-11-10 DIAGNOSIS — E78.9 DISORDER OF LIPOPROTEIN METABOLISM, UNSPECIFIED: ICD-10-CM

## 2021-11-10 DIAGNOSIS — I25.10 ATHEROSCLEROTIC HEART DISEASE OF NATIVE CORONARY ARTERY W/OUT ANGINA PECTORIS: ICD-10-CM

## 2021-11-10 PROCEDURE — 99214 OFFICE O/P EST MOD 30 MIN: CPT | Mod: 25

## 2021-11-10 PROCEDURE — 93000 ELECTROCARDIOGRAM COMPLETE: CPT

## 2021-11-10 NOTE — PHYSICAL EXAM
[Well Developed] : well developed [Well Nourished] : well nourished [Normal Conjunctiva] : normal conjunctiva [Normal Venous Pressure] : normal venous pressure [No Carotid Bruit] : no carotid bruit [Normal S1, S2] : normal S1, S2 [No Murmur] : no murmur [Clear Lung Fields] : clear lung fields [Good Air Entry] : good air entry [Soft] : abdomen soft [Non Tender] : non-tender [Normal Gait] : normal gait [No Edema] : no edema [No Rash] : no rash [No Skin Lesions] : no skin lesions [Moves all extremities] : moves all extremities [No Focal Deficits] : no focal deficits [Alert and Oriented] : alert and oriented

## 2021-11-10 NOTE — REVIEW OF SYSTEMS
[Fever] : no fever [Headache] : no headache [Blurry Vision] : no blurred vision [Earache] : no earache [Discharge From Ears] : no discharge from the ears [SOB] : no shortness of breath [Dyspnea on exertion] : not dyspnea during exertion [Cough] : no cough [Abdominal Pain] : no abdominal pain [Nausea] : no nausea [Dysuria] : no dysuria [Joint Pain] : no joint pain [Myalgia] : no myalgia [Rash] : no rash [Dizziness] : no dizziness [Convulsions] : no convulsions [Confusion] : no confusion was observed [Easy Bleeding] : no tendency for easy bleeding

## 2021-11-10 NOTE — DISCUSSION/SUMMARY
[FreeTextEntry1] : EKG:Normal Sinus  Rhythm 72/min\par \par 1. CAD - stable - cont ASA 81mg po daily - cont exercise as tolerate\par 2. HLP - stable, cont meds.\par 3. HTN - stable, well controlled. Cont losartan 25mg po daily\par 4. DM - f/u with PMD \par 5. CKD -f/u with Dr. Kebede\par 6. Asthma -inhalers PRN\par 7. Labs today\par 8. RTC 4 months.

## 2021-11-10 NOTE — HISTORY OF PRESENT ILLNESS
[FreeTextEntry1] : Patient comes for f/u CAD, s/p PCI of LCx/RCA. No CP. Walks less lately b/o Covid. Inhalers for asthma only PRN- lost f/u with pulmonary. \par

## 2021-11-11 LAB
ALBUMIN SERPL ELPH-MCNC: 4.3 G/DL
ALP BLD-CCNC: 78 U/L
ALT SERPL-CCNC: 13 U/L
ANION GAP SERPL CALC-SCNC: 13 MMOL/L
AST SERPL-CCNC: 16 U/L
BASOPHILS # BLD AUTO: 0.05 K/UL
BASOPHILS NFR BLD AUTO: 0.5 %
BILIRUB SERPL-MCNC: 0.6 MG/DL
BUN SERPL-MCNC: 13 MG/DL
CALCIUM SERPL-MCNC: 9.8 MG/DL
CHLORIDE SERPL-SCNC: 104 MMOL/L
CHOLEST SERPL-MCNC: 118 MG/DL
CO2 SERPL-SCNC: 23 MMOL/L
CREAT SERPL-MCNC: 0.99 MG/DL
EOSINOPHIL # BLD AUTO: 0.1 K/UL
EOSINOPHIL NFR BLD AUTO: 1.1 %
ESTIMATED AVERAGE GLUCOSE: 189 MG/DL
GLUCOSE SERPL-MCNC: 186 MG/DL
HBA1C MFR BLD HPLC: 8.2 %
HCT VFR BLD CALC: 37.5 %
HDLC SERPL-MCNC: 50 MG/DL
HGB BLD-MCNC: 11.4 G/DL
IMM GRANULOCYTES NFR BLD AUTO: 0.3 %
LDLC SERPL CALC-MCNC: 48 MG/DL
LYMPHOCYTES # BLD AUTO: 2.24 K/UL
LYMPHOCYTES NFR BLD AUTO: 24.1 %
MAN DIFF?: NORMAL
MCHC RBC-ENTMCNC: 27.3 PG
MCHC RBC-ENTMCNC: 30.4 GM/DL
MCV RBC AUTO: 89.9 FL
MONOCYTES # BLD AUTO: 0.62 K/UL
MONOCYTES NFR BLD AUTO: 6.7 %
NEUTROPHILS # BLD AUTO: 6.25 K/UL
NEUTROPHILS NFR BLD AUTO: 67.3 %
NONHDLC SERPL-MCNC: 67 MG/DL
PLATELET # BLD AUTO: 269 K/UL
POTASSIUM SERPL-SCNC: 4.7 MMOL/L
PROT SERPL-MCNC: 6.7 G/DL
RBC # BLD: 4.17 M/UL
RBC # FLD: 14.5 %
SODIUM SERPL-SCNC: 140 MMOL/L
TRIGL SERPL-MCNC: 97 MG/DL
WBC # FLD AUTO: 9.29 K/UL

## 2021-12-06 ENCOUNTER — NON-APPOINTMENT (OUTPATIENT)
Age: 66
End: 2021-12-06

## 2022-06-14 ENCOUNTER — NON-APPOINTMENT (OUTPATIENT)
Age: 67
End: 2022-06-14

## 2025-06-03 NOTE — ASU PATIENT PROFILE, ADULT - ANESTHESIA, PREVIOUS REACTION, PROFILE
WatchPAT performed for reevaluation of sleep disordered breathing.  Patient has a history of poor CPAP tolerance.  Had been using an oral appliance.    7 hours 34 minutes recorded of which 6 hours 9 minutes of sleep; 14.9% of sleep in rem.  All sleep stages observed.  Patient slept supine and lateral.    AHI 25.7/h (4% desaturation definition.  Minimal SaO2 72%.  Snoring noted.  Desaturations more pronounced in REM sleep.    Impression: Sleep disordered breathing associated with significant arterial desaturations more prominent in rem.  Patient has history of poor CPAP tolerance.  Has been referred to dental sleep medicine for oral appliance.  Follow-up with HSAT for OAT efficacy.    Sleep technologist: Please advise patient of HSAT results and earlier dental sleep medicine referral.       none